# Patient Record
Sex: FEMALE | Race: WHITE | NOT HISPANIC OR LATINO | Employment: FULL TIME | URBAN - METROPOLITAN AREA
[De-identification: names, ages, dates, MRNs, and addresses within clinical notes are randomized per-mention and may not be internally consistent; named-entity substitution may affect disease eponyms.]

---

## 2017-02-06 DIAGNOSIS — Z30.41 ENCOUNTER FOR SURVEILLANCE OF CONTRACEPTIVE PILLS: Primary | ICD-10-CM

## 2017-02-06 RX ORDER — NORGESTIMATE AND ETHINYL ESTRADIOL 7DAYSX3 28
1 KIT ORAL DAILY
Qty: 84 TABLET | Refills: 0 | Status: CANCELLED | OUTPATIENT
Start: 2017-02-06

## 2017-02-06 NOTE — TELEPHONE ENCOUNTER
Pharmacy sent refill request. TC to pt to get her scheduled for AE due 03/2017. LMTC. Mckenzie Starks RN

## 2017-02-10 NOTE — TELEPHONE ENCOUNTER
Pt has decided that she does not want to be on birth control anymore.  Refill cancelled.  Pt scheduled AE for 3/27/17 at [place].  Birdie Vigil RN

## 2017-11-28 ENCOUNTER — OFFICE VISIT (OUTPATIENT)
Dept: FAMILY MEDICINE | Facility: CLINIC | Age: 32
End: 2017-11-28
Payer: COMMERCIAL

## 2017-11-28 VITALS
TEMPERATURE: 97.4 F | DIASTOLIC BLOOD PRESSURE: 70 MMHG | SYSTOLIC BLOOD PRESSURE: 126 MMHG | BODY MASS INDEX: 21.89 KG/M2 | HEIGHT: 66 IN | HEART RATE: 52 BPM | OXYGEN SATURATION: 100 % | WEIGHT: 136.2 LBS

## 2017-11-28 DIAGNOSIS — F41.8 MIXED ANXIETY DEPRESSIVE DISORDER: ICD-10-CM

## 2017-11-28 DIAGNOSIS — R10.9 ABDOMINAL DISCOMFORT: ICD-10-CM

## 2017-11-28 DIAGNOSIS — R06.02 SOB (SHORTNESS OF BREATH): ICD-10-CM

## 2017-11-28 DIAGNOSIS — E06.3 HASHIMOTO'S DISEASE: Primary | ICD-10-CM

## 2017-11-28 LAB
ALBUMIN SERPL-MCNC: 4.1 G/DL (ref 3.4–5)
ALP SERPL-CCNC: 74 U/L (ref 40–150)
ALT SERPL W P-5'-P-CCNC: 23 U/L (ref 0–50)
ANION GAP SERPL CALCULATED.3IONS-SCNC: 8 MMOL/L (ref 3–14)
AST SERPL W P-5'-P-CCNC: 11 U/L (ref 0–45)
BILIRUB SERPL-MCNC: 0.5 MG/DL (ref 0.2–1.3)
BUN SERPL-MCNC: 12 MG/DL (ref 7–30)
CALCIUM SERPL-MCNC: 9.1 MG/DL (ref 8.5–10.1)
CHLORIDE SERPL-SCNC: 106 MMOL/L (ref 94–109)
CO2 SERPL-SCNC: 24 MMOL/L (ref 20–32)
CREAT SERPL-MCNC: 0.78 MG/DL (ref 0.52–1.04)
FERRITIN SERPL-MCNC: 35 NG/ML (ref 12–150)
GFR SERPL CREATININE-BSD FRML MDRD: 85 ML/MIN/1.7M2
GLUCOSE SERPL-MCNC: 83 MG/DL (ref 70–99)
HGB BLD-MCNC: 14.3 G/DL (ref 11.7–15.7)
POTASSIUM SERPL-SCNC: 3.9 MMOL/L (ref 3.4–5.3)
PROT SERPL-MCNC: 7.4 G/DL (ref 6.8–8.8)
SODIUM SERPL-SCNC: 138 MMOL/L (ref 133–144)
T4 FREE SERPL-MCNC: 0.74 NG/DL (ref 0.76–1.46)
TSH SERPL DL<=0.005 MIU/L-ACNC: 6.31 MU/L (ref 0.4–4)

## 2017-11-28 PROCEDURE — 36415 COLL VENOUS BLD VENIPUNCTURE: CPT | Performed by: PHYSICIAN ASSISTANT

## 2017-11-28 PROCEDURE — 84439 ASSAY OF FREE THYROXINE: CPT | Performed by: PHYSICIAN ASSISTANT

## 2017-11-28 PROCEDURE — 85379 FIBRIN DEGRADATION QUANT: CPT | Performed by: PHYSICIAN ASSISTANT

## 2017-11-28 PROCEDURE — 83516 IMMUNOASSAY NONANTIBODY: CPT | Performed by: PHYSICIAN ASSISTANT

## 2017-11-28 PROCEDURE — 82728 ASSAY OF FERRITIN: CPT | Performed by: PHYSICIAN ASSISTANT

## 2017-11-28 PROCEDURE — 85018 HEMOGLOBIN: CPT | Performed by: PHYSICIAN ASSISTANT

## 2017-11-28 PROCEDURE — 99214 OFFICE O/P EST MOD 30 MIN: CPT | Performed by: PHYSICIAN ASSISTANT

## 2017-11-28 PROCEDURE — 86800 THYROGLOBULIN ANTIBODY: CPT | Performed by: PHYSICIAN ASSISTANT

## 2017-11-28 PROCEDURE — 80053 COMPREHEN METABOLIC PANEL: CPT | Performed by: PHYSICIAN ASSISTANT

## 2017-11-28 PROCEDURE — 86376 MICROSOMAL ANTIBODY EACH: CPT | Performed by: PHYSICIAN ASSISTANT

## 2017-11-28 PROCEDURE — 84443 ASSAY THYROID STIM HORMONE: CPT | Performed by: PHYSICIAN ASSISTANT

## 2017-11-28 ASSESSMENT — PATIENT HEALTH QUESTIONNAIRE - PHQ9: SUM OF ALL RESPONSES TO PHQ QUESTIONS 1-9: 4

## 2017-11-28 NOTE — LETTER
My Depression Action Plan  Name: Shayla Nolasco   Date of Birth 1985  Date: 11/28/2017    My doctor: No primary care provider on file.   My clinic: [address] [address] [phone]          GREEN    ZONE   Good Control    What it looks like:     Things are going generally well. You have normal up s and down s. You may even feel depressed from time to time, but bad moods usually last less than a day.   What you need to do:  1. Continue to care for yourself (see self care plan)  2. Check your depression survival kit and update it as needed  3. Follow your physician s recommendations including any medication.  4. Do not stop taking medication unless you consult with your physician first.           YELLOW         ZONE Getting Worse    What it looks like:     Depression is starting to interfere with your life.     It may be hard to get out of bed; you may be starting to isolate yourself from others.    Symptoms of depression are starting to last most all day and this has happened for several days.     You may have suicidal thoughts but they are not constant.   What you need to do:     1. Call your care team, your response to treatment will improve if you keep your care team informed of your progress. Yellow periods are signs an adjustment may need to be made.     2. Continue your self-care, even if you have to fake it!    3. Talk to someone in your support network    4. Open up your depression survival kit           RED    ZONE Medical Alert - Get Help    What it looks like:     Depression is seriously interfering with your life.     You may experience these or other symptoms: You can t get out of bed most days, can t work or engage in other necessary activities, you have trouble taking care of basic hygiene, or basic responsibilities, thoughts of suicide or death that will not go away, self-injurious behavior.     What you need to  do:  1. Call your care team and request a same-day appointment. If they are not available (weekends or after hours) call your local crisis line, emergency room or 911.      Electronically signed by: Romi Rojo, November 28, 2017    Depression Self Care Plan / Survival Kit    Self-Care for Depression  Here s the deal. Your body and mind are really not as separate as most people think.  What you do and think affects how you feel and how you feel influences what you do and think. This means if you do things that people who feel good do, it will help you feel better.  Sometimes this is all it takes.  There is also a place for medication and therapy depending on how severe your depression is, so be sure to consult with your medical provider and/ or Behavioral Health Consultant if your symptoms are worsening or not improving.     In order to better manage my stress, I will:    Exercise  Get some form of exercise, every day. This will help reduce pain and release endorphins, the  feel good  chemicals in your brain. This is almost as good as taking antidepressants!  This is not the same as joining a gym and then never going! (they count on that by the way ) It can be as simple as just going for a walk or doing some gardening, anything that will get you moving.      Hygiene   Maintain good hygiene (Get out of bed in the morning, Make your bed, Brush your teeth, Take a shower, and Get dressed like you were going to work, even if you are unemployed).  If your clothes don't fit try to get ones that do.    Diet  I will strive to eat foods that are good for me, drink plenty of water, and avoid excessive sugar, caffeine, alcohol, and other mood-altering substances.  Some foods that are helpful in depression are: complex carbohydrates, B vitamins, flaxseed, fish or fish oil, fresh fruits and vegetables.    Psychotherapy  I agree to participate in Individual Therapy (if recommended).    Medication  If prescribed medications, I  agree to take them.  Missing doses can result in serious side effects.  I understand that drinking alcohol, or other illicit drug use, may cause potential side effects.  I will not stop my medication abruptly without first discussing it with my provider.    Staying Connected With Others  I will stay in touch with my friends, family members, and my primary care provider/team.    Use your imagination  Be creative.  We all have a creative side; it doesn t matter if it s oil painting, sand castles, or mud pies! This will also kick up the endorphins.    Witness Beauty  (AKA stop and smell the roses) Take a look outside, even in mid-winter. Notice colors, textures. Watch the squirrels and birds.     Service to others  Be of service to others.  There is always someone else in need.  By helping others we can  get out of ourselves  and remember the really important things.  This also provides opportunities for practicing all the other parts of the program.    Humor  Laugh and be silly!  Adjust your TV habits for less news and crime-drama and more comedy.    Control your stress  Try breathing deep, massage therapy, biofeedback, and meditation. Find time to relax each day.     My support system    Clinic Contact:  Phone number:    Contact 1:  Phone number:    Contact 2:  Phone number:    Zoroastrianism/:  Phone number:    Therapist:  Phone number:    Local crisis center:    Phone number:    Other community support:  Phone number:

## 2017-11-28 NOTE — MR AVS SNAPSHOT
After Visit Summary   11/28/2017    Shayla Nolasco    MRN: [id number]           Patient Information     Date Of Birth          1985        Visit Information        Provider Department      11/28/2017 8:00 AM Agnieszka Bowman PA-C [facility]        Today's Diagnoses     Mixed anxiety depressive disorder    -  1    Subclinical hypothyroidism        SOB (shortness of breath)        Abdominal discomfort        Family history of ovarian cancer          Care Instructions    Labs today   Schedule follow up with breast center  I'll contact you with results  Return to clinic for any new or worsening symptoms or go to ER Urgent care in off hours            Follow-ups after your visit        Additional Services     BREAST CENTER REFERRAL       Your provider has referred you to: [facility]: Breast Center at General acute hospital [phone]   [email or web address]    Please be aware that coverage of these services is subject to the terms and limitations of your health insurance plan.  Call member services at your health plan with any benefit or coverage questions.      Please bring the following with you to your appointment:    (1) Any X-Rays, CTs or MRIs which have been performed.  Contact the facility where they were done to arrange for  prior to your scheduled appointment.    (2) List of current medications   (3) This referral request   (4) Any documents/labs given to you for this referral                  Who to contact     If you have questions or need follow up information about today's clinic visit or your schedule please contact [facility] directly at [phone].  Normal or non-critical lab and imaging results will be communicated to you by MyChart, letter or phone within 4 business days after the clinic has received the results. If you do not hear from us within 7 days, please contact  "the clinic through [employer] or phone. If you have a critical or abnormal lab result, we will notify you by phone as soon as possible.  Submit refill requests through [employer] or call your pharmacy and they will forward the refill request to us. Please allow 3 business days for your refill to be completed.          Additional Information About Your Visit        [employer] Information     [employer] gives you secure access to your electronic health record. If you see a primary care provider, you can also send messages to your care team and make appointments. If you have questions, please call your primary care clinic.  If you do not have a primary care provider, please call [phone] and they will assist you.        Care EveryWhere ID     This is your Care EveryWhere ID. This could be used by other organizations to access your [facility] medical records  [id number]        Your Vitals Were     Pulse Temperature Height Last Period Pulse Oximetry BMI (Body Mass Index)    52 97.4  F (36.3  C) (Oral) 5' 6\" (1.676 m) 11/11/2017 100% 21.98 kg/m2       Blood Pressure from Last 3 Encounters:   11/28/17 126/70   08/10/16 135/84   03/18/16 117/69    Weight from Last 3 Encounters:   11/28/17 136 lb 3.2 oz (61.8 kg)   08/10/16 131 lb (59.4 kg)   03/18/16 132 lb (59.9 kg)              We Performed the Following     Anti thyroglobulin antibody     BREAST CENTER REFERRAL     Comprehensive metabolic panel     D dimer, quantitative     DEPRESSION ACTION PLAN (DAP)     Ferritin     Hemoglobin     Thyroid peroxidase antibody     Tissue transglutaminase leanne IgA and IgG     TSH with free T4 reflex          Today's Medication Changes          These changes are accurate as of: 11/28/17  8:44 AM.  If you have any questions, ask your nurse or doctor.               Stop taking these medicines if you haven't already. Please contact your care team if you have questions.     metroNIDAZOLE 500 MG tablet   Commonly known as:  FLAGYL   Stopped by:  " Agnieszka Bowman PA-C           nitroFURantoin (macrocrystal-monohydrate) 100 MG capsule   Commonly known as:  MACROBID   Stopped by:  Agnieszka Bowman PA-C           norgestim-eth estrad triphasic 0.18/0.215/0.25 MG-35 MCG per tablet   Commonly known as:  TRINESSA (28)   Stopped by:  Agnieszka Bowman PA-C                    Primary Care Provider Fax #    Physician No Ref-Primary [phone]       No address on file        Equal Access to Services     [facility]: Hadii aad ku hadasho Soomaali, waaxda luqadaha, qaybta kaalmada adeegyada, waxruma jamison . So [facility] [phone].    ATENCIÓN: Si habla español, tiene a foley disposición servicios gratuitos de asistencia lingüística. [facility] [phone].    We comply with applicable federal civil rights laws and [place] laws. We do not discriminate on the basis of race, color, national origin, age, disability, sex, sexual orientation, or gender identity.            Thank you!     Thank you for choosing [facility]  for your care. Our goal is always to provide you with excellent care. Hearing back from our patients is one way we can continue to improve our services. Please take a few minutes to complete the written survey that you may receive in the mail after your visit with us. Thank you!             Your Updated Medication List - Protect others around you: Learn how to safely use, store and throw away your medicines at www.disposemymeds.org.          This list is accurate as of: 11/28/17  8:44 AM.  Always use your most recent med list.                   Brand Name Dispense Instructions for use Diagnosis    hydrOXYzine 25 MG tablet    ATARAX    60 tablet    Take 1 tablet (25 mg) by mouth At Bedtime    Rash       ketoconazole 2 % cream    NIZORAL    60 g    Apply topically daily    TV (tinea versicolor)       levothyroxine 25 MCG tablet    SYNTHROID/LEVOTHROID    45 tablet    Take 0.5 tablets  (12.5 mcg) by mouth daily    Subclinical hypothyroidism       MULTIVITAMIN PO           triamcinolone 0.1 % ointment    KENALOG    453 g    Apply topically 2 times daily    Rash

## 2017-11-28 NOTE — PROGRESS NOTES
"  SUBJECTIVE:   Shayla Nolasco is a 31 year old female who presents to clinic today for the following health issues:    Hypothyroidism Follow-up      Since last visit, patient describes the following symptoms: { :735573::\"Weight stable, no hair loss, no skin changes, no constipation, no loose stools\"}      Amount of exercise or physical activity: {Exercise frequency days per week:444107}    Problems taking medications regularly: {Med Problems:315882::\"No\"}    Medication side effects: {CHRONIC MED SIDE EFFECTS:874685::\"none\"}    Diet: { :155882}          {additional problems for provider to add:541383}    Problem list and histories reviewed & adjusted, as indicated.  Additional history: {NONE - AS DOCUMENTED:146961::\"as documented\"}    {HIST REVIEW/ LINKS 2:784779}    Reviewed and updated as needed this visit by clinical staff     Reviewed and updated as needed this visit by Provider         {PROVIDER CHARTING PREFERENCE:970394}    "

## 2017-11-28 NOTE — PATIENT INSTRUCTIONS
Labs today   Schedule follow up with breast center  I'll contact you with results  Return to clinic for any new or worsening symptoms or go to ER Urgent care in off hours

## 2017-11-28 NOTE — PROGRESS NOTES
SUBJECTIVE:   Shayla Nolasco is a 31 year old female who presents to clinic today for the following health issues:    Hypothyroidism Follow-up      Since last visit, patient describes the following symptoms: anxiety and fatigue  Was diagnosed in March of 2016 but never went with the treatment plan, would like to be tested again      Amount of exercise or physical activity: 4-5 days/week for an average of 30-60 minutes    Problems taking medications regularly: No    Medication side effects: none    Diet: vegetarian/vegan        Sister in law was recently diagnosed with hypothyroidism and she realized how much better she felt after starting the medications    She had started the levothyroxine for about 6 weeks, doesn't remember if there was a difference. She was seeing a therapist. She felt more therapy was causing more anxiety because of the cost and what it was bringing up. Focused more on meditation and yoga. Still does some distance running but not marathons.     Also reports continued shortness of breath for several years. She's tried inhalers before. It's gotten worse over the last several years. Spirometry was normal.     Mom passed away from ovarian cancer. Shayla was tested for BRACA gene which was negative. Per OB, no further follow up needed.         Problem list and histories reviewed & adjusted, as indicated.  Additional history: as documented    ROS:  C: NEGATIVE for fever, chills, change in weight  I: NEGATIVE for worrisome rashes, moles or lesions  E: NEGATIVE for vision changes or irritation  E/M: NEGATIVE for ear, mouth and throat problems  RESP:NEGATIVE for cough-non productive, hemoptysis, pleurisy and wheezing  CV: NEGATIVE for chest pain, palpitations or peripheral edema  GI: NEGATIVE for diarrhea, heartburn or reflux, jaundice and melena  : NEGATIVE for frequency, dysuria, or hematuria  M: NEGATIVE for significant arthralgias or myalgia  N: NEGATIVE for weakness, dizziness or  "paresthesias  E: NEGATIVE for temperature intolerance, skin/hair changes  H: NEGATIVE for bleeding problems  P: NEGATIVE for changes in mood or affect    Patient Active Problem List   Diagnosis     QUEENIE III (cervical intraepithelial neoplasia III)     Family history of ovarian cancer     Family history of BRCA gene positive     Mixed anxiety depressive disorder     Subclinical hypothyroidism     Past Surgical History:   Procedure Laterality Date     CL AFF SURGICAL PATHOLOGY  10/2007    LEEP     CL AFF SURGICAL PATHOLOGY  9/2007       Social History   Substance Use Topics     Smoking status: Never Smoker     Smokeless tobacco: Never Used     Alcohol use 2.5 oz/week     5 Glasses of wine per week     Family History   Problem Relation Age of Onset     DIABETES Mother      IDDM (type one)     CANCER Mother 51     Ovarian Cancer (3 times)     DIABETES Maternal Grandmother      IDDM (adult onset) Type 2     Hypertension Maternal Grandmother      Prostate Cancer Paternal Grandfather      Cancer - colorectal Paternal Grandfather      age 70's (had stomach Ca as a younger man)     Neurologic Disorder Father      migraines     GASTROINTESTINAL DISEASE Father      stomach ulcers     Lipids Maternal Grandfather      high cholesterol     Circulatory Maternal Grandfather      blood clots     C.A.D. Maternal Grandfather      MI     Skin Cancer No family hx of      no skin cancer           Labs reviewed in EPIC  BP Readings from Last 3 Encounters:   11/28/17 126/70   08/10/16 135/84   03/18/16 117/69    Wt Readings from Last 3 Encounters:   11/28/17 136 lb 3.2 oz (61.8 kg)   08/10/16 131 lb (59.4 kg)   03/18/16 132 lb (59.9 kg)                      OBJECTIVE:                                                    /70  Pulse 52  Temp 97.4  F (36.3  C) (Oral)  Ht 5' 6\" (1.676 m)  Wt 136 lb 3.2 oz (61.8 kg)  LMP 11/11/2017  SpO2 100%  BMI 21.98 kg/m2 Body mass index is 21.98 kg/(m^2).   GENERAL: healthy, alert, well " nourished, well hydrated, no distress  EYES: Eyes grossly normal to inspection, extraocular movements - intact, and PERRL  HENT: ear canals- normal; TMs- normal; Nose- normal; Mouth- no ulcers, no lesions  NECK: no tenderness, no adenopathy, no asymmetry, no masses, no stiffness; thyroid- normal to palpation  RESP: lungs clear to auscultation - no rales, no rhonchi, no wheezes  CV: regular rates and rhythm, normal S1 S2, no S3 or S4 and no murmur, no click or rub -  ABDOMEN: soft, no tenderness, no  hepatosplenomegaly, no masses, normal bowel sounds  MS: extremities- no gross deformities noted, no edema  SKIN: no suspicious lesions, no rashes  NEURO: strength and tone- normal, sensory exam- grossly normal, mentation- intact, speech- normal, reflexes- symmetric  PSYCH: Alert and oriented times 3; speech- coherent , normal rate and volume; able to articulate logical thoughts, able to abstract reason, no tangential thoughts, no hallucinations or delusions, affect- normal    Results for orders placed or performed in visit on 11/28/17 (from the past 24 hour(s))   Hemoglobin   Result Value Ref Range    Hemoglobin 14.3 11.7 - 15.7 g/dL          ASSESSMENT/PLAN:                                                        ICD-10-CM    1. Subclinical hypothyroidism E03.9 TSH with free T4 reflex     Anti thyroglobulin antibody     Thyroid peroxidase antibody   2. SOB (shortness of breath) R06.02 Ferritin     Hemoglobin     Comprehensive metabolic panel     D dimer, quantitative   3. Abdominal discomfort R10.9 Tissue transglutaminase leanne IgA and IgG   4. Mixed anxiety depressive disorder F41.8      Check labs. Will refer to appropriate place depending on labs. Rule out elevated thyroid antibodies and celiacs. Check iron levels to rule out anemia which could be causing some of her shortness of breath. Spirometry has been normal and she hasn't responded to inhalers. We'll check a d-dimer to rule out PE. Return to clinic for any new or  "worsening symptoms or go to ER Urgent care in off hours    > 25 minutes were spent with the patient and / or family present in education and / or counseling regarding the above issues.  This represented more than 50% of the time spent interacting with the patient during this visit.     Patient Instructions   Labs today   Schedule follow up with breast center  I'll contact you with results  Return to clinic for any new or worsening symptoms or go to ER Urgent care in off hours          Estimated body mass index is 21.98 kg/(m^2) as calculated from the following:    Height as of this encounter: 5' 6\" (1.676 m).    Weight as of this encounter: 136 lb 3.2 oz (61.8 kg).       Agnieszka Villarreal [facility]    "

## 2017-11-29 LAB
D DIMER PPP FEU-MCNC: <0.3 UG/ML FEU (ref 0–0.5)
THYROGLOB AB SERPL IA-ACNC: <20 IU/ML (ref 0–40)
THYROPEROXIDASE AB SERPL-ACNC: 269 IU/ML
TTG IGA SER-ACNC: <1 U/ML
TTG IGG SER-ACNC: <1 U/ML

## 2017-11-30 RX ORDER — LEVOTHYROXINE SODIUM 25 UG/1
12.5 TABLET ORAL DAILY
Qty: 45 TABLET | Refills: 0 | Status: SHIPPED | OUTPATIENT
Start: 2017-11-30 | End: 2018-01-15

## 2018-01-15 DIAGNOSIS — E06.3 HASHIMOTO'S DISEASE: ICD-10-CM

## 2018-01-15 LAB — TSH SERPL DL<=0.005 MIU/L-ACNC: 3.97 MU/L (ref 0.4–4)

## 2018-01-15 PROCEDURE — 84443 ASSAY THYROID STIM HORMONE: CPT | Performed by: PHYSICIAN ASSISTANT

## 2018-01-15 PROCEDURE — 36415 COLL VENOUS BLD VENIPUNCTURE: CPT | Performed by: PHYSICIAN ASSISTANT

## 2018-01-15 RX ORDER — LEVOTHYROXINE SODIUM 25 UG/1
12.5 TABLET ORAL DAILY
Qty: 45 TABLET | Refills: 1 | Status: SHIPPED | OUTPATIENT
Start: 2018-01-15 | End: 2018-08-25

## 2018-06-04 ENCOUNTER — HEALTH MAINTENANCE LETTER (OUTPATIENT)
Age: 33
End: 2018-06-04

## 2018-08-25 DIAGNOSIS — E06.3 HASHIMOTO'S DISEASE: ICD-10-CM

## 2018-08-28 RX ORDER — LEVOTHYROXINE SODIUM 25 UG/1
TABLET ORAL
Qty: 45 TABLET | Refills: 1 | Status: SHIPPED | OUTPATIENT
Start: 2018-08-28 | End: 2018-12-13

## 2018-08-28 NOTE — TELEPHONE ENCOUNTER
Prescription approved per [facility] Refill Protocol.    Caroline Pineda RN   [facility]

## 2018-09-19 ENCOUNTER — OFFICE VISIT (OUTPATIENT)
Dept: FAMILY MEDICINE | Facility: CLINIC | Age: 33
End: 2018-09-19
Payer: COMMERCIAL

## 2018-09-19 VITALS
SYSTOLIC BLOOD PRESSURE: 130 MMHG | OXYGEN SATURATION: 97 % | DIASTOLIC BLOOD PRESSURE: 79 MMHG | TEMPERATURE: 98.3 F | HEART RATE: 52 BPM | WEIGHT: 137 LBS | HEIGHT: 66 IN | BODY MASS INDEX: 22.02 KG/M2 | RESPIRATION RATE: 14 BRPM

## 2018-09-19 DIAGNOSIS — F41.8 MIXED ANXIETY DEPRESSIVE DISORDER: ICD-10-CM

## 2018-09-19 DIAGNOSIS — K21.9 GASTROESOPHAGEAL REFLUX DISEASE WITHOUT ESOPHAGITIS: Primary | ICD-10-CM

## 2018-09-19 DIAGNOSIS — E06.3 HASHIMOTO'S DISEASE: ICD-10-CM

## 2018-09-19 DIAGNOSIS — Z23 NEED FOR VACCINATION: ICD-10-CM

## 2018-09-19 PROCEDURE — 99214 OFFICE O/P EST MOD 30 MIN: CPT | Mod: 25 | Performed by: FAMILY MEDICINE

## 2018-09-19 PROCEDURE — 90471 IMMUNIZATION ADMIN: CPT | Performed by: FAMILY MEDICINE

## 2018-09-19 PROCEDURE — 90686 IIV4 VACC NO PRSV 0.5 ML IM: CPT | Performed by: FAMILY MEDICINE

## 2018-09-19 ASSESSMENT — ANXIETY QUESTIONNAIRES
GAD7 TOTAL SCORE: 13
3. WORRYING TOO MUCH ABOUT DIFFERENT THINGS: MORE THAN HALF THE DAYS
1. FEELING NERVOUS, ANXIOUS, OR ON EDGE: MORE THAN HALF THE DAYS
2. NOT BEING ABLE TO STOP OR CONTROL WORRYING: MORE THAN HALF THE DAYS
IF YOU CHECKED OFF ANY PROBLEMS ON THIS QUESTIONNAIRE, HOW DIFFICULT HAVE THESE PROBLEMS MADE IT FOR YOU TO DO YOUR WORK, TAKE CARE OF THINGS AT HOME, OR GET ALONG WITH OTHER PEOPLE: SOMEWHAT DIFFICULT
7. FEELING AFRAID AS IF SOMETHING AWFUL MIGHT HAPPEN: MORE THAN HALF THE DAYS
6. BECOMING EASILY ANNOYED OR IRRITABLE: SEVERAL DAYS
5. BEING SO RESTLESS THAT IT IS HARD TO SIT STILL: MORE THAN HALF THE DAYS

## 2018-09-19 ASSESSMENT — PATIENT HEALTH QUESTIONNAIRE - PHQ9: 5. POOR APPETITE OR OVEREATING: MORE THAN HALF THE DAYS

## 2018-09-19 NOTE — PROGRESS NOTES
SUBJECTIVE:   Shayla Nolasco is a 32 year old female who presents to clinic today for the following health issues:      Abdominal Pain      Duration: x1 week    Description (location/character/radiation): upper epi-gastric pain, always after eating- took niki salzer not better- got more worried due to family history of stmach acnecr.    Does not  Take  over the counter pain meds            Associated flank pain: None    Intensity:  severe    Accompanying signs and symptoms:        Fever/Chills: no        Gas/Bloating: YES-burpy       Nausea/vomitting: YES- nausea       Diarrhea: no        Dysuria or Hematuria: no     History (previous similar pain/trauma/previous testing): stomach pain as teen-testing at [facility], inconclusive-history heartburn/indigestion    Precipitating or alleviating factors:       Pain worse with eating/BM/urination: yes, eating       Pain relieved by BM: no     Therapies tried and outcome: niki-seltzer, heartburn tabs    LMP:  9-10-18  grandpa- deid of stomach caner - so  asked her to be checked  She does report she is freaking out because of family history   Mom  of breast cancer at age 57, had ovarian cancer    PROBLEMS TO ADD ON...stressed about possibility of moving to [place] for  job  Last/ final interview this weekend- they are going to check it out- hot air balloon ride- worried about weather. Does report she is a worrier.  History of anxiety- did do some therapy- but it did   Help but always had struggle to decide to go to therapy getting there was stressful & after therapy- over analyzed & at the end caused her more stress.      Problem list and histories reviewed & adjusted, as indicated.  Additional history: as documented    Patient Active Problem List   Diagnosis     QUEENIE III (cervical intraepithelial neoplasia III)     Family history of ovarian cancer     Family history of BRCA gene positive     Mixed anxiety depressive disorder     Hashimoto's disease     Past  "Surgical History:   Procedure Laterality Date     CL AFF SURGICAL PATHOLOGY  10/2007    LEEP     CL AFF SURGICAL PATHOLOGY  9/2007       Social History   Substance Use Topics     Smoking status: Never Smoker     Smokeless tobacco: Never Used     Alcohol use 2.5 oz/week     5 Glasses of wine per week     Family History   Problem Relation Age of Onset     Diabetes Mother      IDDM (type one)     Cancer Mother 51     Ovarian Cancer (3 times)     Diabetes Maternal Grandmother      IDDM (adult onset) Type 2     Hypertension Maternal Grandmother      Prostate Cancer Paternal Grandfather      Cancer - colorectal Paternal Grandfather      age 70's (had stomach Ca as a younger man)     Neurologic Disorder Father      migraines     GASTROINTESTINAL DISEASE Father      stomach ulcers     Lipids Maternal Grandfather      high cholesterol     Circulatory Maternal Grandfather      blood clots     C.A.D. Maternal Grandfather      MI     Skin Cancer No family hx of      no skin cancer           Reviewed and updated as needed this visit by clinical staff       Reviewed and updated as needed this visit by Provider         ROS:  Constitutional, HEENT, cardiovascular, pulmonary, gi and gu systems are negative, except as otherwise noted.    OBJECTIVE:     /79  Pulse 52  Temp 98.3  F (36.8  C) (Oral)  Resp 14  Ht 5' 6\" (1.676 m)  Wt 137 lb (62.1 kg)  LMP 09/10/2018 (Exact Date)  SpO2 97%  Breastfeeding? No  BMI 22.11 kg/m2  Body mass index is 22.11 kg/(m^2).  GENERAL: healthy, alert and no distress  NECK: no adenopathy, no asymmetry, masses, or scars and thyroid normal to palpation  RESP: lungs clear to auscultation - no rales, rhonchi or wheezes  CV: regular rate and rhythm, normal S1 S2, no S3 or S4, no murmur, click or rub, no peripheral edema and peripheral pulses strong  ABDOMEN: soft, nontender, no hepatosplenomegaly, no masses and bowel sounds normal  MS: no gross musculoskeletal defects noted, no edema  PSYCH: " mentation appears normal, affect normal/bright  PHQ-9 SCORE 3/15/2016 11/28/2017 9/19/2018   Total Score 11 4 5     CATE-7 SCORE 3/15/2016 9/19/2018   Total Score 13 13       ASSESSMENT/PLAN:   1. Gastroesophageal reflux disease without esophagitis  Plan:start proton pump inhibitors for 8 weeks and then as needed    - omeprazole (PRILOSEC) 20 MG CR capsule; Take 1 capsule (20 mg) by mouth daily  Dispense: 30 capsule; Refill: 3  - ok to take margo in between  ok to stop medications- if does not like side effects.    2. Mixed anxiety depressive disorder  Plan: detail discussion about self care , yoga, sleep hydration.  Consider counseling , patient not ready yet.  encouraged to follow up to discuss further option.  CATE-7 SCORE 3/15/2016 9/19/2018   Total Score 13 13     PHQ-9 SCORE 3/15/2016 11/28/2017 9/19/2018   Total Score 11 4 5           3. Need for vaccination  - FLU VAC PRESRV FREE QUAD SPLIT VIR, IM (3+ YRS)  - ADMIN 1st VACCINE    History of hypothyroidism  Lab Results   Component Value Date    TSH 3.97 01/15/2018   Levothyroxine 12.5 mcg once daily  No change         Rina Bailey MD  [facility]

## 2018-09-19 NOTE — NURSING NOTE
"Chief Complaint   Patient presents with     Abdominal Pain       Initial /79  Pulse 52  Temp 98.3  F (36.8  C) (Oral)  Resp 14  Ht 5' 6\" (1.676 m)  Wt 137 lb (62.1 kg)  LMP 09/10/2018 (Exact Date)  SpO2 97%  Breastfeeding? No  BMI 22.11 kg/m2 Estimated body mass index is 22.11 kg/(m^2) as calculated from the following:    Height as of this encounter: 5' 6\" (1.676 m).    Weight as of this encounter: 137 lb (62.1 kg).  BP completed using cuff size: regular    Health Maintenance that is potentially due pending provider review:  Health Maintenance Due   Topic Date Due     PAP Q3 YR  04/01/2018     PHQ-9 Q6 MONTHS  05/28/2018     INFLUENZA VACCINE (1) 09/01/2018       [id number]  Flu vaccine today  PHQ 9 given  "

## 2018-09-19 NOTE — PATIENT INSTRUCTIONS
Tips to Control Acid Reflux    To control acid reflux, you ll need to make some basic diet and lifestyle changes. The simple steps outlined below may be all you ll need to ease discomfort.  Watch what you eat    Avoid fatty foods and spicy foods.    Eat fewer acidic foods, such as citrus and tomato-based foods. These can increase symptoms.    Limit drinking alcohol, caffeine, and fizzy beverages. All increase acid reflux.    Try limiting chocolate, peppermint, and spearmint. These can worsen acid reflux in some people.  Watch when you eat    Avoid lying down for 3 hours after eating.    Do not snack before going to bed.  Raise your head  Raising your head and upper body by 4 to 6 inches helps limit reflux when you re lying down. Put blocks under the head of your bed frame to raise it.  Other changes    Lose weight, if you need to    Don t exercise near bedtime    Avoid tight-fitting clothes    Limit aspirin and ibuprofen    Stop smoking   Date Last Reviewed: 7/1/2016 2000-2017 The [employer]. [address] [place] [address], [place] [address]. All rights reserved. This information is not intended as a substitute for professional medical care. Always follow your healthcare professional's instructions.        Treating Anxiety Disorders with Medicine  An anxiety disorder can make you feel nervous or apprehensive, even without a clear reason. In people age 65 and older, generalized anxiety disorder is one of the most commonly diagnosed anxiety disorders. Many times it occurs with depression. Certain anxiety disorders can cause intense feelings of fear or panic. You may even have physical symptoms such as a racing heartbeat, sweating, or dizziness. If you have these feelings, you don t have to suffer anymore. Treatment to help you overcome your fears will likely include therapy (also called counseling). Medicine may also be prescribed to help control your symptoms.    Medicines  Certain medicines may be prescribed  to help control your symptoms. So you may feel less anxious. You may also feel able to move forward with therapy. At first, medicines and dosages may need to be adjusted to find what works best for you. Try to be patient. Tell your healthcare provider how a medicine makes you feel. This way, you can work together to find the treatment that s best for you. Keep in mind that medicines can have side effects. Talk with your provider about any side effects that are bothering you. Changing the dose or type of medicine may help. Don t stop taking medicine on your own. That can cause symptoms to come back.    Anti-anxiety medicine. This medicine eases symptoms and helps you relax. Your healthcare provider will explain when and how to use it. It may be prescribed for use before situations that make you anxious. You may also be told to take medicine on a regular schedule. Anti-anxiety medicine may make you feel a little sleepy or  out of it.  Don t drive a car or operate machinery while on this medicine, until you know how it affects you.  Caution  Never use alcohol or other drugs with anti-anxiety medicines. This could result in loss of muscular control, sedation, coma, or death. Also, use only the amount of medicine prescribed for you. If you think you may have taken too much, get emergency care right away.     Antidepressant medicine. This kind of medicine is often used to treat anxiety, even if you aren t depressed. An antidepressant helps balance out brain chemicals. This helps keep anxiety under control. This medicine is taken on a schedule. It takes a few weeks to start working. If you don t notice a change at first, you may just need more time. But if you don t notice results after the first few weeks, tell your provider.  Keep taking medicines as prescribed  Never change your dosage, share or use another person's medicine, or stop taking your medicines without talking to your healthcare provider first. Keep the  following in mind:    Some medicines must be taken on a schedule. Make this part of your daily routine. For instance, always take your pill before brushing your teeth. A pillbox can help you remember if you ve taken your medicine each day.    Medicines are often taken for 6 to 12 months. Your healthcare provider will then evaluate whether you need to stay on them. Many people who have also had therapy may no longer need medicine to manage anxiety.    You may need to stop taking medicine slowly to give your body time to adjust. When it s time to stop, your healthcare provider will tell you more. Remember: Never stop taking your medicine without talking to your provider first.    If symptoms return, you may need to start taking medicines again. This isn t your fault. It s just the nature of your anxiety disorder.  Special concerns    Side effects. Medicines may cause side effects. Ask your healthcare provider or pharmacist what you can expect. They may have ideas for avoiding some side effects.    Sexual problems. Some antidepressants can affect your desire for sex or your ability to have an orgasm. A change in dosage or medicine often solves the problem. If you have a sexual side effect that concerns you, tell your healthcare provider.    Addiction. If you ve never had a problem with drugs or alcohol, you may not have a problem with medicines used to treat anxiety disorders. But always discuss the medicines with your healthcare provider before taking them. If you have a history of addiction, you may not be able to use certain medicines used to treat anxiety disorders.    Medicine interactions. Always check with your pharmacist before using any over-the-counter medicines, including herbal supplements.   Date Last Reviewed: 5/1/2017 2000-2017 [employer]. [address] [place] [address], [place] [address]. All rights reserved. This information is not intended as a substitute for professional medical care.  Always follow your healthcare professional's instructions.

## 2018-09-19 NOTE — MR AVS SNAPSHOT
After Visit Summary   9/19/2018    Shayla Nolasco    MRN: [id number]           Patient Information     Date Of Birth          1985        Visit Information        Provider Department      9/19/2018 9:40 AM Rina Bailey MD [facility]        Today's Diagnoses     Gastroesophageal reflux disease without esophagitis    -  1    Mixed anxiety depressive disorder        Hashimoto's disease        Need for vaccination          Care Instructions      Tips to Control Acid Reflux    To control acid reflux, you ll need to make some basic diet and lifestyle changes. The simple steps outlined below may be all you ll need to ease discomfort.  Watch what you eat    Avoid fatty foods and spicy foods.    Eat fewer acidic foods, such as citrus and tomato-based foods. These can increase symptoms.    Limit drinking alcohol, caffeine, and fizzy beverages. All increase acid reflux.    Try limiting chocolate, peppermint, and spearmint. These can worsen acid reflux in some people.  Watch when you eat    Avoid lying down for 3 hours after eating.    Do not snack before going to bed.  Raise your head  Raising your head and upper body by 4 to 6 inches helps limit reflux when you re lying down. Put blocks under the head of your bed frame to raise it.  Other changes    Lose weight, if you need to    Don t exercise near bedtime    Avoid tight-fitting clothes    Limit aspirin and ibuprofen    Stop smoking   Date Last Reviewed: 7/1/2016 2000-2017 [employer]. [address] [place] [address], [place] [address]. All rights reserved. This information is not intended as a substitute for professional medical care. Always follow your healthcare professional's instructions.        Treating Anxiety Disorders with Medicine  An anxiety disorder can make you feel nervous or apprehensive, even without a clear reason. In people age 65 and older, generalized anxiety disorder is one of the most commonly diagnosed  anxiety disorders. Many times it occurs with depression. Certain anxiety disorders can cause intense feelings of fear or panic. You may even have physical symptoms such as a racing heartbeat, sweating, or dizziness. If you have these feelings, you don t have to suffer anymore. Treatment to help you overcome your fears will likely include therapy (also called counseling). Medicine may also be prescribed to help control your symptoms.    Medicines  Certain medicines may be prescribed to help control your symptoms. So you may feel less anxious. You may also feel able to move forward with therapy. At first, medicines and dosages may need to be adjusted to find what works best for you. Try to be patient. Tell your healthcare provider how a medicine makes you feel. This way, you can work together to find the treatment that s best for you. Keep in mind that medicines can have side effects. Talk with your provider about any side effects that are bothering you. Changing the dose or type of medicine may help. Don t stop taking medicine on your own. That can cause symptoms to come back.    Anti-anxiety medicine. This medicine eases symptoms and helps you relax. Your healthcare provider will explain when and how to use it. It may be prescribed for use before situations that make you anxious. You may also be told to take medicine on a regular schedule. Anti-anxiety medicine may make you feel a little sleepy or  out of it.  Don t drive a car or operate machinery while on this medicine, until you know how it affects you.  Caution  Never use alcohol or other drugs with anti-anxiety medicines. This could result in loss of muscular control, sedation, coma, or death. Also, use only the amount of medicine prescribed for you. If you think you may have taken too much, get emergency care right away.     Antidepressant medicine. This kind of medicine is often used to treat anxiety, even if you aren t depressed. An antidepressant helps  balance out brain chemicals. This helps keep anxiety under control. This medicine is taken on a schedule. It takes a few weeks to start working. If you don t notice a change at first, you may just need more time. But if you don t notice results after the first few weeks, tell your provider.  Keep taking medicines as prescribed  Never change your dosage, share or use another person's medicine, or stop taking your medicines without talking to your healthcare provider first. Keep the following in mind:    Some medicines must be taken on a schedule. Make this part of your daily routine. For instance, always take your pill before brushing your teeth. A pillbox can help you remember if you ve taken your medicine each day.    Medicines are often taken for 6 to 12 months. Your healthcare provider will then evaluate whether you need to stay on them. Many people who have also had therapy may no longer need medicine to manage anxiety.    You may need to stop taking medicine slowly to give your body time to adjust. When it s time to stop, your healthcare provider will tell you more. Remember: Never stop taking your medicine without talking to your provider first.    If symptoms return, you may need to start taking medicines again. This isn t your fault. It s just the nature of your anxiety disorder.  Special concerns    Side effects. Medicines may cause side effects. Ask your healthcare provider or pharmacist what you can expect. They may have ideas for avoiding some side effects.    Sexual problems. Some antidepressants can affect your desire for sex or your ability to have an orgasm. A change in dosage or medicine often solves the problem. If you have a sexual side effect that concerns you, tell your healthcare provider.    Addiction. If you ve never had a problem with drugs or alcohol, you may not have a problem with medicines used to treat anxiety disorders. But always discuss the medicines with your healthcare provider  before taking them. If you have a history of addiction, you may not be able to use certain medicines used to treat anxiety disorders.    Medicine interactions. Always check with your pharmacist before using any over-the-counter medicines, including herbal supplements.   Date Last Reviewed: 5/1/2017 2000-2017 [employer]. [address] [place] [address]. All rights reserved. This information is not intended as a substitute for professional medical care. Always follow your healthcare professional's instructions.                Follow-ups after your visit        Who to contact     If you have questions or need follow up information about today's clinic visit or your schedule please contact [facility] directly at [phone].  Normal or non-critical lab and imaging results will be communicated to you by Hipvanhart, letter or phone within 4 business days after the clinic has received the results. If you do not hear from us within 7 days, please contact the clinic through Vittanat or phone. If you have a critical or abnormal lab result, we will notify you by phone as soon as possible.  Submit refill requests through Maiyet or call your pharmacy and they will forward the refill request to us. Please allow 3 business days for your refill to be completed.          Additional Information About Your Visit        [employer]har20[employer] Information     Maiyet gives you secure access to your electronic health record. If you see a primary care provider, you can also send messages to your care team and make appointments. If you have questions, please call your primary care clinic.  If you do not have a primary care provider, please call [phone] and they will assist you.        Care EveryWhere ID     This is your Care EveryWhere ID. This could be used by other organizations to access your [place] medical records  [id number]        Your Vitals Were     Pulse Temperature Respirations Height Last Period  "Pulse Oximetry    52 98.3  F (36.8  C) (Oral) 14 5' 6\" (1.676 m) 09/10/2018 (Exact Date) 97%    Breastfeeding? BMI (Body Mass Index)                No 22.11 kg/m2           Blood Pressure from Last 3 Encounters:   09/19/18 130/79   11/28/17 126/70   08/10/16 135/84    Weight from Last 3 Encounters:   09/19/18 137 lb (62.1 kg)   11/28/17 136 lb 3.2 oz (61.8 kg)   08/10/16 131 lb (59.4 kg)              We Performed the Following     ADMIN 1st VACCINE     FLU VAC PRESRV FREE QUAD SPLIT VIR, IM (3+ YRS)          Today's Medication Changes          These changes are accurate as of 9/19/18 11:04 AM.  If you have any questions, ask your nurse or doctor.               Start taking these medicines.        Dose/Directions    omeprazole 20 MG CR capsule   Commonly known as:  priLOSEC   Used for:  Gastroesophageal reflux disease without esophagitis   Started by:  Rina Bailey MD        Dose:  20 mg   Take 1 capsule (20 mg) by mouth daily   Quantity:  30 capsule   Refills:  3            Where to get your medicines      These medications were sent to [id number] - [address] [address]     Phone:  [phone]     omeprazole 20 MG CR capsule                Primary Care Provider Office Phone # Fax #    Agnieszka Bowman PA-C [phone]       [address] [address]        Equal Access to Services     GENE MEDINA AH: Ana wrighto Somelanie, waaxda luqadaha, qaybta kaalmada adeegyada, gen dockery. So [facility] [phone].    ATENCIÓN: Si habla español, tiene a foley disposición servicios gratuitos de asistencia lingüística. Llame al [phone].    We comply with applicable federal civil rights laws and [place] laws. We do not discriminate on the basis of race, color, national origin, age, disability, sex, sexual orientation, or gender identity.            Thank you!     Thank " you for choosing [facility]  for your care. Our goal is always to provide you with excellent care. Hearing back from our patients is one way we can continue to improve our services. Please take a few minutes to complete the written survey that you may receive in the mail after your visit with us. Thank you!             Your Updated Medication List - Protect others around you: Learn how to safely use, store and throw away your medicines at www.disposemymeds.org.          This list is accurate as of 9/19/18 11:04 AM.  Always use your most recent med list.                   Brand Name Dispense Instructions for use Diagnosis    hydrOXYzine 25 MG tablet    ATARAX    60 tablet    Take 1 tablet (25 mg) by mouth At Bedtime    Rash       ketoconazole 2 % cream    NIZORAL    60 g    Apply topically daily    TV (tinea versicolor)       levothyroxine 25 MCG tablet    SYNTHROID/LEVOTHROID    45 tablet    TAKE 0.5 TABLETS (12.5 MCG) BY MOUTH DAILY    Hashimoto's disease       MAGNESIUM GLYCINATE PLUS PO      Take 3 tablets by mouth At Bedtime        MULTIVITAMIN PO           omeprazole 20 MG CR capsule    priLOSEC    30 capsule    Take 1 capsule (20 mg) by mouth daily    Gastroesophageal reflux disease without esophagitis       triamcinolone 0.1 % ointment    KENALOG    453 g    Apply topically 2 times daily    Rash       UNABLE TO FIND      Take 2 capsules by mouth every morning MEDICATION NAME: hpa Axis adrenal support supplement

## 2018-09-19 NOTE — NURSING NOTE
"Chief Complaint   Patient presents with     Abdominal Pain     Injectable Influenza Immunization Documentation    1.  Has the patient received the information for the injectable influenza vaccine? YES     2. Is the patient 6 months of age or older? YES     3. Does the patient have any of the following contraindications?         Severe allergy to eggs? No     Severe allergic reaction to previous influenza vaccines? No   Severe allergy to latex? No       History of Guillain-Lincoln syndrome? No     Currently have a temperature greater than 100.4F? No        4.  Severely egg allergic patients should have flu vaccine eligibility assessed by an MD, RN, or pharmacist, and those who received flu vaccine should be observed for 15 min by an MD, RN, Pharmacist, Medical Technician, or member of clinic staff.\": YES    5. Latex-allergic patients should be given latex-free influenza vaccine Yes. Please reference the Vaccine latex table to determine if your clinic s product is latex-containing.       Vaccination given by MARIZA Saldaña CMA          "

## 2018-09-20 ASSESSMENT — PATIENT HEALTH QUESTIONNAIRE - PHQ9: SUM OF ALL RESPONSES TO PHQ QUESTIONS 1-9: 5

## 2018-09-20 ASSESSMENT — ANXIETY QUESTIONNAIRES: GAD7 TOTAL SCORE: 13

## 2018-09-27 ENCOUNTER — OFFICE VISIT (OUTPATIENT)
Dept: URGENT CARE | Facility: URGENT CARE | Age: 33
End: 2018-09-27
Payer: COMMERCIAL

## 2018-09-27 VITALS
HEART RATE: 54 BPM | OXYGEN SATURATION: 98 % | DIASTOLIC BLOOD PRESSURE: 91 MMHG | SYSTOLIC BLOOD PRESSURE: 138 MMHG | TEMPERATURE: 98.6 F

## 2018-09-27 DIAGNOSIS — R30.0 DYSURIA: Primary | ICD-10-CM

## 2018-09-27 DIAGNOSIS — R82.90 NONSPECIFIC FINDING ON EXAMINATION OF URINE: ICD-10-CM

## 2018-09-27 LAB

## 2018-09-27 PROCEDURE — 81001 URINALYSIS AUTO W/SCOPE: CPT | Performed by: INTERNAL MEDICINE

## 2018-09-27 PROCEDURE — 87086 URINE CULTURE/COLONY COUNT: CPT | Performed by: INTERNAL MEDICINE

## 2018-09-28 LAB
BACTERIA SPEC CULT: NORMAL
SPECIMEN SOURCE: NORMAL

## 2018-10-03 NOTE — PROGRESS NOTES
Triage note: A pleasant 32 year olfd female accompanied by her  presents to urgent care for an unrelenting periumbilical and epigastric pain present throughout the day today. No chest pain or dyspnea. She is stable and directed to [facility] ER now for evaluation and management. Patient and her  agree w this plan. Patient is stable upon departure from the urgent care.

## 2018-10-31 ENCOUNTER — OFFICE VISIT (OUTPATIENT)
Dept: FAMILY MEDICINE | Facility: CLINIC | Age: 33
End: 2018-10-31
Payer: COMMERCIAL

## 2018-10-31 VITALS
HEART RATE: 57 BPM | DIASTOLIC BLOOD PRESSURE: 80 MMHG | RESPIRATION RATE: 16 BRPM | WEIGHT: 139 LBS | BODY MASS INDEX: 22.44 KG/M2 | SYSTOLIC BLOOD PRESSURE: 112 MMHG | TEMPERATURE: 98 F | OXYGEN SATURATION: 100 %

## 2018-10-31 DIAGNOSIS — L30.9 DERMATITIS: Primary | ICD-10-CM

## 2018-10-31 PROCEDURE — 99213 OFFICE O/P EST LOW 20 MIN: CPT | Performed by: PHYSICIAN ASSISTANT

## 2018-10-31 RX ORDER — PREDNISONE 10 MG/1
10 TABLET ORAL DAILY
Qty: 5 TABLET | Refills: 0 | Status: SHIPPED | OUTPATIENT
Start: 2018-10-31 | End: 2018-11-05

## 2018-10-31 NOTE — MR AVS SNAPSHOT
After Visit Summary   10/31/2018    Shayla Nolasco    MRN: [id number]           Patient Information     Date Of Birth          1985        Visit Information        Provider Department      10/31/2018 7:50 AM Nanci Mcdaniels PA-C [facility]        Today's Diagnoses     Dermatitis    -  1       Follow-ups after your visit        Follow-up notes from your care team     Return in about 1 week (around 11/7/2018), or if symptoms worsen or fail to improve.      Who to contact     If you have questions or need follow up information about today's clinic visit or your schedule please contact [facility] directly at [phone].  Normal or non-critical lab and imaging results will be communicated to you by MyChart, letter or phone within 4 business days after the clinic has received the results. If you do not hear from us within 7 days, please contact the clinic through [email or web address] or phone. If you have a critical or abnormal lab result, we will notify you by phone as soon as possible.  Submit refill requests through [employer] or call your pharmacy and they will forward the refill request to us. Please allow 3 business days for your refill to be completed.          Additional Information About Your Visit        MyChart Information     [employer] gives you secure access to your electronic health record. If you see a primary care provider, you can also send messages to your care team and make appointments. If you have questions, please call your primary care clinic.  If you do not have a primary care provider, please call [phone] and they will assist you.        Care EveryWhere ID     This is your Care EveryWhere ID. This could be used by other organizations to access your [facility] medical records  [id number]        Your Vitals Were     Pulse Temperature Respirations Pulse Oximetry BMI (Body Mass Index)       57 98  F (36.7  C) 16 100%  22.44 kg/m2        Blood Pressure from Last 3 Encounters:   10/31/18 112/80   09/27/18 (!) 138/91   09/19/18 130/79    Weight from Last 3 Encounters:   10/31/18 139 lb (63 kg)   09/19/18 137 lb (62.1 kg)   11/28/17 136 lb 3.2 oz (61.8 kg)              We Performed the Following     DEPRESSION ACTION PLAN (DAP)          Today's Medication Changes          These changes are accurate as of 10/31/18  8:32 AM.  If you have any questions, ask your nurse or doctor.               Start taking these medicines.        Dose/Directions    predniSONE 10 MG tablet   Commonly known as:  DELTASONE   Used for:  Dermatitis   Started by:  Nanci Mcdaniels PA-C        Dose:  10 mg   Take 1 tablet (10 mg) by mouth daily for 5 days   Quantity:  5 tablet   Refills:  0            Where to get your medicines      These medications were sent to [facility]/pharmacy #[id number] - [place], [place] - [address] [facility]  [address] [facility] [address] [facility] [address]     Phone:  [phone]     predniSONE 10 MG tablet                Primary Care Provider Office Phone # Fax #    Agnieszka Cherelle Bowman PA-C [phone]       [address]  [facility] [address]        Equal Access to Services     MITA MEDINA AH: Hadii karen ku hadasho Soomaali, waaxda luqadaha, qaybta kaalmada adeegyada, waxay darrenin maurice dockery. So [facility] [phone].    ATENCIÓN: Si habla español, tiene a foley disposición servicios gratuitos de asistencia lingüística. Llame al [phone].    We comply with applicable federal civil rights laws and [place] laws. We do not discriminate on the basis of race, color, national origin, age, disability, sex, sexual orientation, or gender identity.            Thank you!     Thank you for choosing [facility]  for your care. Our goal is always to provide you with excellent care. Hearing back from our patients is one way we can continue to improve our services. Please take a  few minutes to complete the written survey that you may receive in the mail after your visit with us. Thank you!             Your Updated Medication List - Protect others around you: Learn how to safely use, store and throw away your medicines at www.disposemymeds.org.          This list is accurate as of 10/31/18  8:32 AM.  Always use your most recent med list.                   Brand Name Dispense Instructions for use Diagnosis    hydrOXYzine 25 MG tablet    ATARAX    60 tablet    Take 1 tablet (25 mg) by mouth At Bedtime    Rash       ketoconazole 2 % cream    NIZORAL    60 g    Apply topically daily    TV (tinea versicolor)       levothyroxine 25 MCG tablet    SYNTHROID/LEVOTHROID    45 tablet    TAKE 0.5 TABLETS (12.5 MCG) BY MOUTH DAILY    Hashimoto's disease       MAGNESIUM GLYCINATE PLUS PO      Take 3 tablets by mouth At Bedtime        MULTIVITAMIN PO           omeprazole 20 MG CR capsule    priLOSEC    30 capsule    Take 1 capsule (20 mg) by mouth daily    Gastroesophageal reflux disease without esophagitis       predniSONE 10 MG tablet    DELTASONE    5 tablet    Take 1 tablet (10 mg) by mouth daily for 5 days    Dermatitis       triamcinolone 0.1 % ointment    KENALOG    453 g    Apply topically 2 times daily    Rash       UNABLE TO FIND      Take 2 capsules by mouth every morning MEDICATION NAME: hpa Axis adrenal support supplement

## 2018-10-31 NOTE — LETTER
My Depression Action Plan  Name: Shayla Nolasco   Date of Birth 1985  Date: 10/31/2018    My doctor: Agnieszka Bowman   My clinic: [address]  [facility] [address] [phone]          GREEN    ZONE   Good Control    What it looks like:     Things are going generally well. You have normal up s and down s. You may even feel depressed from time to time, but bad moods usually last less than a day.   What you need to do:  1. Continue to care for yourself (see self care plan)  2. Check your depression survival kit and update it as needed  3. Follow your physician s recommendations including any medication.  4. Do not stop taking medication unless you consult with your physician first.           YELLOW         ZONE Getting Worse    What it looks like:     Depression is starting to interfere with your life.     It may be hard to get out of bed; you may be starting to isolate yourself from others.    Symptoms of depression are starting to last most all day and this has happened for several days.     You may have suicidal thoughts but they are not constant.   What you need to do:     1. Call your care team, your response to treatment will improve if you keep your care team informed of your progress. Yellow periods are signs an adjustment may need to be made.     2. Continue your self-care, even if you have to fake it!    3. Talk to someone in your support network    4. Open up your depression survival kit           RED    ZONE Medical Alert - Get Help    What it looks like:     Depression is seriously interfering with your life.     You may experience these or other symptoms: You can t get out of bed most days, can t work or engage in other necessary activities, you have trouble taking care of basic hygiene, or basic responsibilities, thoughts of suicide or death that will not go away, self-injurious behavior.     What  you need to do:  1. Call your care team and request a same-day appointment. If they are not available (weekends or after hours) call your local crisis line, emergency room or 911.            Depression Self Care Plan / Survival Kit    Self-Care for Depression  Here s the deal. Your body and mind are really not as separate as most people think.  What you do and think affects how you feel and how you feel influences what you do and think. This means if you do things that people who feel good do, it will help you feel better.  Sometimes this is all it takes.  There is also a place for medication and therapy depending on how severe your depression is, so be sure to consult with your medical provider and/ or Behavioral Health Consultant if your symptoms are worsening or not improving.     In order to better manage my stress, I will:    Exercise  Get some form of exercise, every day. This will help reduce pain and release endorphins, the  feel good  chemicals in your brain. This is almost as good as taking antidepressants!  This is not the same as joining a gym and then never going! (they count on that by the way ) It can be as simple as just going for a walk or doing some gardening, anything that will get you moving.      Hygiene   Maintain good hygiene (Get out of bed in the morning, Make your bed, Brush your teeth, Take a shower, and Get dressed like you were going to work, even if you are unemployed).  If your clothes don't fit try to get ones that do.    Diet  I will strive to eat foods that are good for me, drink plenty of water, and avoid excessive sugar, caffeine, alcohol, and other mood-altering substances.  Some foods that are helpful in depression are: complex carbohydrates, B vitamins, flaxseed, fish or fish oil, fresh fruits and vegetables.    Psychotherapy  I agree to participate in Individual Therapy (if recommended).    Medication  If prescribed medications, I agree to take them.  Missing doses can result  in serious side effects.  I understand that drinking alcohol, or other illicit drug use, may cause potential side effects.  I will not stop my medication abruptly without first discussing it with my provider.    Staying Connected With Others  I will stay in touch with my friends, family members, and my primary care provider/team.    Use your imagination  Be creative.  We all have a creative side; it doesn t matter if it s oil painting, sand castles, or mud pies! This will also kick up the endorphins.    Witness Beauty  (AKA stop and smell the roses) Take a look outside, even in mid-winter. Notice colors, textures. Watch the squirrels and birds.     Service to others  Be of service to others.  There is always someone else in need.  By helping others we can  get out of ourselves  and remember the really important things.  This also provides opportunities for practicing all the other parts of the program.    Humor  Laugh and be silly!  Adjust your TV habits for less news and crime-drama and more comedy.    Control your stress  Try breathing deep, massage therapy, biofeedback, and meditation. Find time to relax each day.     My support system    Clinic Contact:  Phone number:    Contact 1:  Phone number:    Contact 2:  Phone number:    Zoroastrianism/:  Phone number:    Therapist:  Phone number:    Local crisis center:    Phone number:    Other community support:  Phone number:

## 2018-10-31 NOTE — PROGRESS NOTES
SUBJECTIVE:   Shayla Nolasco is a 32 year old female who presents to clinic today for the following health issues:      Eye(s) Problem      Duration: ALMOST A WEEK    Description:  Location: right around outside of eye and now left seems to maybe be starting  Pain: no  Redness: YES  Discharge: no     Accompanying signs and symptoms: itching    History (Trauma, foreign body exposure,): None    Precipitating or alleviating factors (contact use): None    Therapies tried and outcome: None      HPI additional notes:   Chief Complaint   Patient presents with     Eye Problem     Shayla presents today with eye rash. Sx started on Rt eyelids 5 days ago, now present on Lt as well. Patient left mascara on overnight and woke with irritated, itchy rash on Rt eyelids. Took some Benadryl without relief. Was without mascara for two day, wore some yesterday and now symptoms worsening again. Denies f/c/s, vision change, discharge, rhinorrhea, foreign body sensation, dizziness or lightheadedness, HA.     ROS:    A Comprehensive greater than 10 system review of systems was carried out.    Pertinent positives and negatives are noted above.  Otherwise negative for contributory information.      Chart Review:  History   Smoking Status     Never Smoker   Smokeless Tobacco     Never Used       Patient Active Problem List   Diagnosis     QUEENIE III (cervical intraepithelial neoplasia III)     Family history of ovarian cancer     Family history of BRCA gene positive     Mixed anxiety depressive disorder     Hashimoto's disease     Gastroesophageal reflux disease without esophagitis     Past Surgical History:   Procedure Laterality Date     [facility] SURGICAL PATHOLOGY  10/2007    LEEP     [facility] SURGICAL PATHOLOGY  9/2007     Problem list, Medication list, Allergies, Medical/Social/Surg hx reviewed in [employer], updated as appropriate.   OBJECTIVE:                                                    /80  Pulse 57  Temp 98  F (36.7  C)  Resp  16  Wt 139 lb (63 kg)  SpO2 100%  BMI 22.44 kg/m2  Body mass index is 22.44 kg/(m^2).  GENERAL: WDWN, no acute distress  PSYCH: pleasant, cooperative  EYES: Well-demarcated, pink, scaly patches scattered along bilat eyelid margins. PERRL, EOMI. No injection, no discharge.  HENT:  Normocephalic. Moist mucus membranes.  NECK:  Supple, symmetric  EXTREMITIES:  No gross deformities, moves all 4 limbs spontaneously  SKIN:  Warm and dry, no rash or suspicious lesions    NEUROLOGIC: alert, sensation grossly intact.    Diagnostic test results: none     ASSESSMENT/PLAN:                                                          ICD-10-CM    1. Dermatitis L30.9 predniSONE (DELTASONE) 10 MG tablet     Rash consistent with contact/irritant dermatitis, likely from mascara. Discontinue use of mascara and do not try different brand until rash resolves. Given sensitive location, would avoid topical treatments. Given lack of response to OTC antihistamine, will treat with low-dose prednisone burst: 10 mg every day x5 days. Avoid rubbing as this will make rash worse. May continue antihistamine to help minimize itch. Apply cool compress to minimize itch as well.    Please see patient instructions for treatment details.    Follow up in 7-10 days if not improving as anticipated, sooner PRN.    Nanci Mcdaniels PA-C  [facility]

## 2018-11-26 ENCOUNTER — E-VISIT (OUTPATIENT)
Dept: FAMILY MEDICINE | Facility: CLINIC | Age: 33
End: 2018-11-26
Payer: COMMERCIAL

## 2018-11-26 ENCOUNTER — TELEPHONE (OUTPATIENT)
Dept: FAMILY MEDICINE | Facility: CLINIC | Age: 33
End: 2018-11-26

## 2018-11-26 DIAGNOSIS — L24.3 IRRITANT CONTACT DERMATITIS DUE TO COSMETICS: Primary | ICD-10-CM

## 2018-11-26 DIAGNOSIS — L30.9 DERMATITIS: ICD-10-CM

## 2018-11-26 PROCEDURE — 99444 ZZC PHYSICIAN ONLINE EVALUATION & MANAGEMENT SERVICE: CPT | Performed by: PHYSICIAN ASSISTANT

## 2018-11-26 RX ORDER — PREDNISONE 10 MG/1
10 TABLET ORAL DAILY
Qty: 5 TABLET | Refills: 0 | Status: CANCELLED | OUTPATIENT
Start: 2018-11-26

## 2018-11-26 RX ORDER — PREDNISONE 20 MG/1
10 TABLET ORAL DAILY
Qty: 3 TABLET | Refills: 0 | Status: SHIPPED | OUTPATIENT
Start: 2018-11-26 | End: 2018-12-01

## 2018-11-26 NOTE — TELEPHONE ENCOUNTER
Reason for call:  Patient reporting a symptom    Symptom or request: itchiness and eye swelling    Duration (how long have symptoms been present): 2 days    Have you been treated for this before? Yes    Additional comments: Pt was treated for this before with predinisone.  Symptoms improved.  Now it has come back again.  She now knows it was a reaction from some eye cream.  She would like  Refill on the prednisone.     Phone Number patient can be reached at:  Cell number on file:    Telephone Information:   Mobile [phone]       Best Time:  any    Can we leave a detailed message on this number:  YES    Call taken on 11/26/2018 at 8:23 AM by LINDA CAMPOS

## 2018-11-26 NOTE — TELEPHONE ENCOUNTER
Patient called reporting mainly right eye but both eyes are effected     Eye(s) Problem  Onset: Thursday 11/22/18    Description:   Location: right  Pain: no   Redness: YES    Accompanying Signs & Symptoms:  Discharge/mattering: no   Swelling: YES  Visual changes: no   Fever: no   Nasal Congestion: no   Bothered by bright lights: no     History:   Trauma: no   Foreign body exposure: no     Precipitating factors:   Wearing contacts: no     Alleviating factors:  Improved by: stopping eye cream/mascara    Therapies Tried and outcome: stopping eye cream, contact dermatitis she thinks from using the eye cream     Recent Medication changes: none identified           References used: Telephone Triage Protocols for Nurses fifth edition       Routing  to provider for review/advice because:  Patient is requesting prednisone refill. She was seen in October for this same issue and thinks that it is from the eye cream she had been using        SHARYN MurdockN RN  Triage RN  [facility]

## 2018-11-26 NOTE — PATIENT INSTRUCTIONS
"  Contact Dermatitis  Contact dermatitis is a skin rash caused by something that touches the skin and makes it irritated and inflamed. Your skin may be red, swollen, dry, and may be cracked. Blisters may form and ooze. The rash will itch.  Contact dermatitis can form on the face and neck, backs of hands, forearms, genitals, and lower legs.  People can get contact dermatitis from lots of sources. These include:    Plants such as poison ivy, oak, or sumac    Chemicals in hair dyes and rinses, soaps, solvents, waxes, fingernail polish, and deodorants     Jewelry or watchbands made of nickel  Contact dermatitis is not passed from person to person.  Talk with your healthcare provider about what may have caused the rash. A type of allergy testing called \"patch testing\" may be used to discover what you are allergic to. You will need to avoid the source of your rash in the future to prevent it from coming back.  Treatment is done to relieve itching and prevent the rash from coming back. The rash should go away in a few days to a few weeks.  Home care  Your healthcare provider may prescribe medicine to relieve swelling and itching. Follow all instructions when using these medicines.  General care:    Avoid anything that heats up your skin, such as hot showers or baths, or direct sunlight. This can make itching worse.    Apply cold compresses to soothe your sores to help relieve your symptoms. Do this for 30 minutes 3 to 4 times a day. You can make a cold compress by soaking a cloth in cold water. Squeeze out excess water. You can add colloidal oatmeal to the water to help reduce itching. For severe itching in a small area, apply an ice pack wrapped in a thin towel. Do this for 20 minutes 3 to 4 times a day.    You can also try wet dressings. One way to do this is to wear a wet piece of clothing under a dry one. Wear a damp shirt under a dry shirt if your upper body is affected. This can relieve itching and prevent you from " scratching the affected area.    You can also help relieve large areas of itching by taking a lukewarm bath with colloidal oatmeal added to the water.    Use hydrocortisone cream for redness and irritation, unless another medicine was prescribed. You can also use benzocaine anesthetic cream or spray. Calamine lotion can also relieve mild symptoms.    Use oral diphenhydramine to help reduce itching. You can buy this antihistamine at drug and grocery stores. It can make you sleepy, so use lower doses during the daytime. Or you can use loratadine. This is an antihistamine that will not make you sleepy. Do not use diphenhydramine if you have glaucoma or have trouble urinating due to an enlarged prostate.    If a plant causes your rash, make sure to wash your skin and the clothes you were wearing when you came into contact with the plant. This is to wash away the plant oils that gave you the rash and prevent more or worse symptoms.    Stay away from the substance or object that causes your symptoms. If you can t avoid it, wear gloves or some other type of protection.  Follow-up care  Follow up with your healthcare provider, or as advised.  When to seek medical advice  Call your healthcare provider right away if any of these occur:    Spreading of the rash to other parts of your body    Severe swelling of your face, eyelids, mouth, throat or tongue    Trouble urinating due to swelling in the genital area    Fever of 100.4 F (38 C) or higher    Redness or swelling that gets worse    Pain that gets worse    Foul-smelling fluid leaking from the skin    Yellow-brown crusts on the open blisters  Date Last Reviewed: 9/1/2016 2000-2018 [employer]. [address] [place] [id number]. All rights reserved. This information is not intended as a substitute for professional medical care. Always follow your healthcare professional's instructions.

## 2018-11-26 NOTE — MR AVS SNAPSHOT
"              After Visit Summary   11/26/2018    Shayla Nolasco    MRN: [id number]           Patient Information     Date Of Birth          1985        Visit Information        Provider Department      11/26/2018 10:07 AM Nanci Mcdaniels PA-C [facility]        Today's Diagnoses     Irritant contact dermatitis due to cosmetics    -  1      Care Instructions      Contact Dermatitis  Contact dermatitis is a skin rash caused by something that touches the skin and makes it irritated and inflamed. Your skin may be red, swollen, dry, and may be cracked. Blisters may form and ooze. The rash will itch.  Contact dermatitis can form on the face and neck, backs of hands, forearms, genitals, and lower legs.  People can get contact dermatitis from lots of sources. These include:    Plants such as poison ivy, oak, or sumac    Chemicals in hair dyes and rinses, soaps, solvents, waxes, fingernail polish, and deodorants     Jewelry or watchbands made of nickel  Contact dermatitis is not passed from person to person.  Talk with your healthcare provider about what may have caused the rash. A type of allergy testing called \"patch testing\" may be used to discover what you are allergic to. You will need to avoid the source of your rash in the future to prevent it from coming back.  Treatment is done to relieve itching and prevent the rash from coming back. The rash should go away in a few days to a few weeks.  Home care  Your healthcare provider may prescribe medicine to relieve swelling and itching. Follow all instructions when using these medicines.  General care:    Avoid anything that heats up your skin, such as hot showers or baths, or direct sunlight. This can make itching worse.    Apply cold compresses to soothe your sores to help relieve your symptoms. Do this for 30 minutes 3 to 4 times a day. You can make a cold compress by soaking a cloth in cold water. Squeeze out excess water. " You can add colloidal oatmeal to the water to help reduce itching. For severe itching in a small area, apply an ice pack wrapped in a thin towel. Do this for 20 minutes 3 to 4 times a day.    You can also try wet dressings. One way to do this is to wear a wet piece of clothing under a dry one. Wear a damp shirt under a dry shirt if your upper body is affected. This can relieve itching and prevent you from scratching the affected area.    You can also help relieve large areas of itching by taking a lukewarm bath with colloidal oatmeal added to the water.    Use hydrocortisone cream for redness and irritation, unless another medicine was prescribed. You can also use benzocaine anesthetic cream or spray. Calamine lotion can also relieve mild symptoms.    Use oral diphenhydramine to help reduce itching. You can buy this antihistamine at drug and grocery stores. It can make you sleepy, so use lower doses during the daytime. Or you can use loratadine. This is an antihistamine that will not make you sleepy. Do not use diphenhydramine if you have glaucoma or have trouble urinating due to an enlarged prostate.    If a plant causes your rash, make sure to wash your skin and the clothes you were wearing when you came into contact with the plant. This is to wash away the plant oils that gave you the rash and prevent more or worse symptoms.    Stay away from the substance or object that causes your symptoms. If you can t avoid it, wear gloves or some other type of protection.  Follow-up care  Follow up with your healthcare provider, or as advised.  When to seek medical advice  Call your healthcare provider right away if any of these occur:    Spreading of the rash to other parts of your body    Severe swelling of your face, eyelids, mouth, throat or tongue    Trouble urinating due to swelling in the genital area    Fever of 100.4 F (38 C) or higher    Redness or swelling that gets worse    Pain that gets worse    Foul-smelling  fluid leaking from the skin    Yellow-brown crusts on the open blisters  Date Last Reviewed: 9/1/2016 2000-2018 [employer], [employer] [address] [place] [address], [place] [address]. All rights reserved. This information is not intended as a substitute for professional medical care. Always follow your healthcare professional's instructions.                Follow-ups after your visit        Follow-up notes from your care team     Return in about 1 week (around 12/3/2018), or if symptoms worsen or fail to improve.      Your next 10 appointments already scheduled     Nov 29, 2018  7:20 AM CST   New Patient with Agnieszka Bowman PA-C   [facility] ([facility])    [address] [place] [id number] [phone]              Who to contact     If you have questions or need follow up information about today's clinic visit or your schedule please contact [facility] directly at [phone].  Normal or non-critical lab and imaging results will be communicated to you by [email or web address], letter or phone within 4 business days after the clinic has received the results. If you do not hear from us within 7 days, please contact the clinic through Blazentt or phone. If you have a critical or abnormal lab result, we will notify you by phone as soon as possible.  Submit refill requests through [employer]zup or call your pharmacy and they will forward the refill request to us. Please allow 3 business days for your refill to be completed.          Additional Information About Your Visit        [email or web address] Information     [employer] gives you secure access to your electronic health record. If you see a primary care provider, you can also send messages to your care team and make appointments. If you have questions, please call your primary care clinic.  If you do not have a primary care provider, please call [phone] and they will assist you.         Care EveryWhere ID     This is your Care EveryWhere ID. This could be used by other organizations to access your [place] medical records  [id number]         Blood Pressure from Last 3 Encounters:   10/31/18 112/80   09/27/18 (!) 138/91   09/19/18 130/79    Weight from Last 3 Encounters:   10/31/18 139 lb (63 kg)   09/19/18 137 lb (62.1 kg)   11/28/17 136 lb 3.2 oz (61.8 kg)              Today, you had the following     No orders found for display         Today's Medication Changes          These changes are accurate as of 11/26/18 10:28 AM.  If you have any questions, ask your nurse or doctor.               Start taking these medicines.        Dose/Directions    predniSONE 20 MG tablet   Commonly known as:  DELTASONE   Used for:  Irritant contact dermatitis due to cosmetics   Started by:  Nanci Mcdaniels PA-C        Dose:  10 mg   Take 0.5 tablets (10 mg) by mouth daily for 5 days   Quantity:  3 tablet   Refills:  0            Where to get your medicines      These medications were sent to [facility]/pharmacy #[id number] - [place] [id number] [place] [id number] [facility] [address]     Phone:  [phone]     predniSONE 20 MG tablet                Primary Care Provider Office Phone # Fax #    Agnieszka Cherelle Bowman PA-C [phone]       [address] [address]        Equal Access to Services     [facility] Center[facility]FIA AH: Hadii karen ku hadasho Soomaali, waaxda luqadaha, qaybta kaalmada adeegyada, gen jamison . So [facility] [phone].    ATENCIÓN: Si habla español, tiene a foley disposición servicios gratuitos de asistencia lingüística. Llame al [phone].    We comply with applicable federal civil rights laws and [place] laws. We do not discriminate on the basis of race, color, national origin, age, disability, sex, sexual orientation, or gender identity.            Thank you!     Thank you for choosing [facility]  for your care. Our goal is always to provide you with excellent care. Hearing back from our patients is one way we can continue to improve our services. Please take a few minutes to complete the written survey that you may receive in the mail after your visit with us. Thank you!             Your Updated Medication List - Protect others around you: Learn how to safely use, store and throw away your medicines at www.disposemymeds.org.          This list is accurate as of 11/26/18 10:28 AM.  Always use your most recent med list.                   Brand Name Dispense Instructions for use Diagnosis    hydrOXYzine 25 MG tablet    ATARAX    60 tablet    Take 1 tablet (25 mg) by mouth At Bedtime    Rash       ketoconazole 2 % cream    NIZORAL    60 g    Apply topically daily    TV (tinea versicolor)       levothyroxine 25 MCG tablet    SYNTHROID/LEVOTHROID    45 tablet    TAKE 0.5 TABLETS (12.5 MCG) BY MOUTH DAILY    Hashimoto's disease       MAGNESIUM GLYCINATE PLUS PO      Take 3 tablets by mouth At Bedtime        MULTIVITAMIN PO           omeprazole 20 MG CR capsule    priLOSEC    30 capsule    Take 1 capsule (20 mg) by mouth daily    Gastroesophageal reflux disease without esophagitis       predniSONE 20 MG tablet    DELTASONE    3 tablet    Take 0.5 tablets (10 mg) by mouth daily for 5 days    Irritant contact dermatitis due to cosmetics       triamcinolone 0.1 % ointment    KENALOG    453 g    Apply topically 2 times daily    Rash       UNABLE TO FIND      Take 2 capsules by mouth every morning MEDICATION NAME: hpa Axis adrenal support supplement

## 2018-12-11 ENCOUNTER — OFFICE VISIT (OUTPATIENT)
Dept: FAMILY MEDICINE | Facility: CLINIC | Age: 33
End: 2018-12-11
Payer: COMMERCIAL

## 2018-12-11 VITALS
OXYGEN SATURATION: 100 % | BODY MASS INDEX: 22.03 KG/M2 | HEART RATE: 48 BPM | SYSTOLIC BLOOD PRESSURE: 103 MMHG | DIASTOLIC BLOOD PRESSURE: 62 MMHG | WEIGHT: 136.5 LBS | TEMPERATURE: 98 F

## 2018-12-11 DIAGNOSIS — E61.1 IRON DEFICIENCY: ICD-10-CM

## 2018-12-11 DIAGNOSIS — Z12.4 SCREENING FOR MALIGNANT NEOPLASM OF CERVIX: ICD-10-CM

## 2018-12-11 DIAGNOSIS — F41.1 GAD (GENERALIZED ANXIETY DISORDER): ICD-10-CM

## 2018-12-11 DIAGNOSIS — R10.13 EPIGASTRIC PAIN: ICD-10-CM

## 2018-12-11 DIAGNOSIS — E56.9 VITAMIN DEFICIENCY: ICD-10-CM

## 2018-12-11 DIAGNOSIS — Z00.00 ROUTINE GENERAL MEDICAL EXAMINATION AT A HEALTH CARE FACILITY: Primary | ICD-10-CM

## 2018-12-11 DIAGNOSIS — E06.3 HASHIMOTO'S THYROIDITIS: ICD-10-CM

## 2018-12-11 DIAGNOSIS — F50.9 EATING DISORDER: ICD-10-CM

## 2018-12-11 PROCEDURE — 99395 PREV VISIT EST AGE 18-39: CPT | Performed by: PHYSICIAN ASSISTANT

## 2018-12-11 PROCEDURE — 99214 OFFICE O/P EST MOD 30 MIN: CPT | Mod: 25 | Performed by: PHYSICIAN ASSISTANT

## 2018-12-11 PROCEDURE — 87624 HPV HI-RISK TYP POOLED RSLT: CPT | Performed by: PHYSICIAN ASSISTANT

## 2018-12-11 PROCEDURE — G0145 SCR C/V CYTO,THINLAYER,RESCR: HCPCS | Performed by: PHYSICIAN ASSISTANT

## 2018-12-11 NOTE — PATIENT INSTRUCTIONS
Continue taking mulitvitamin  Decrease exercise to no more than 50 min per day, and at least 1 rest day. Try for 2   Ginseng 500 mg daily for 2 weeks on, 1 week off  Start seeing a therapist  Recheck in 4-6 weeks  50 grams of fat daily minimum  Return to clinic for any new or worsening symptoms or go to ER Urgent care in off hours      Preventive Health Recommendations  Female Ages 26 - 39  Yearly exam:   See your health care provider every year in order to    Review health changes.     Discuss preventive care.      Review your medicines if you your doctor has prescribed any.    Until age 30: Get a Pap test every three years (more often if you have had an abnormal result).    After age 30: Talk to your doctor about whether you should have a Pap test every 3 years or have a Pap test with HPV screening every 5 years.   You do not need a Pap test if your uterus was removed (hysterectomy) and you have not had cancer.  You should be tested each year for STDs (sexually transmitted diseases), if you're at risk.   Talk to your provider about how often to have your cholesterol checked.  If you are at risk for diabetes, you should have a diabetes test (fasting glucose).  Shots: Get a flu shot each year. Get a tetanus shot every 10 years.   Nutrition:     Eat at least 5 servings of fruits and vegetables each day.    Eat whole-grain bread, whole-wheat pasta and brown rice instead of white grains and rice.    Get adequate Calcium and Vitamin D.     Lifestyle    Exercise at least 150 minutes a week (30 minutes a day, 5 days of the week). This will help you control your weight and prevent disease.    Limit alcohol to one drink per day.    No smoking.     Wear sunscreen to prevent skin cancer.    See your dentist every six months for an exam and cleaning.

## 2018-12-11 NOTE — PROGRESS NOTES
SUBJECTIVE:   CC: Shayla Nolasco is an 32 year old woman who presents for preventive health visit.     Healthy Habits:    Do you get at least three servings of calcium containing foods daily (dairy, green leafy vegetables, etc.)? yes    Amount of exercise or daily activities, outside of work: 5-6 day(s) per week    Problems taking medications regularly No    Medication side effects: No    Have you had an eye exam in the past two years? no    Do you see a dentist twice per year? yes    Do you have sleep apnea, excessive snoring or daytime drowsiness?no    Psych  -Patient is experiencing anxiety/depression    -She doesn't feel like her stress level is too high  -Patient reports having a history of body image disorders  -She sought counseling in the past but it was unhelpful as it caused her additional stress    Neuro  -Patient has not been getting 8 hours of sleep  -She reports waking up in the middle of the night and has difficult getting back to sleep  -Patient does not have difficulty getting to sleep, her problem is with staying asleep    GI  -She started having painful stomach aches after eating food starting in September  -Patient was prescribed Omeprazole which was unhelpful  -She ended up seeing urgent care who recommended she go the ER, she didn't follow through  -Patient's symptoms eventually subsided and she has not had problems since that time  -She relates feeling particularly stressed when the stomach pains occurred   -She has taken a probiotic since that time      -Her menstrual periods have been irregular  -She has had issues with cramping  -Patient has been spotting before she gets her menstrual period  -She has not been taking birth control since 2017    Neck  -She is worried that she has thyroid issues  -Patient took her Levothyroxine this morning  -She reports feeling either too warm or too cold, as though she can't regulate her temperature well  -She feels flushed, too warm  -Patient has  "night sweats  -She has been experiencing dysphagia for years, it can be painful and is worsening  -Patient finds her dysphagia is worse when eating meat  -She describes it as \"her esophagus cannot push the food down\"    Diet/Exercise  -She exercises 5 or 6 days per week  -Patient's exercise is mainly through running and strength training  -She has started eating meat again after her iron levels were too low last year  -Patient tries to eat a mainly plant based diet  -She relates not eating a lot of fat in her diet    Past Medical History  -Patient has a medical history of HPV and cervical neoplasia  -She has a medical history of body image issues    Medications  -She is taking a daily multivitamin from [employer]  -She does not take extra Vitamin D supplements      Today's PHQ-2 Score:   PHQ-2 ( 1999 [employer]) 12/11/2018 3/18/2016   Q1: Little interest or pleasure in doing things 1 0   Q2: Feeling down, depressed or hopeless 2 2   PHQ-2 Score 3 2       Abuse: Current or Past(Physical, Sexual or Emotional)- No  Do you feel safe in your environment? Yes    Social History     Tobacco Use     Smoking status: Never Smoker     Smokeless tobacco: Never Used   Substance Use Topics     Alcohol use: Yes     Alcohol/week: 2.5 oz     Types: 5 Glasses of wine per week     If you drink alcohol do you typically have >3 drinks per day or >7 drinks per week? No                     Reviewed orders with patient.  Reviewed health maintenance and updated orders accordingly - Yes  Labs reviewed in EPIC  Patient Active Problem List   Diagnosis     QUEENIE III (cervical intraepithelial neoplasia III)     Family history of ovarian cancer     Family history of BRCA gene positive     Mixed anxiety depressive disorder     Hashimoto's disease     Gastroesophageal reflux disease without esophagitis     Past Surgical History:   Procedure Laterality Date     CL AFF SURGICAL PATHOLOGY  10/2007    LEEP     CL AFF SURGICAL PATHOLOGY  9/2007       Social " History     Tobacco Use     Smoking status: Never Smoker     Smokeless tobacco: Never Used   Substance Use Topics     Alcohol use: Yes     Alcohol/week: 2.5 oz     Types: 5 Glasses of wine per week     Family History   Problem Relation Age of Onset     Diabetes Mother         IDDM (type one)     Cancer Mother 51        Ovarian Cancer (3 times)     Diabetes Maternal Grandmother         IDDM (adult onset) Type 2     Hypertension Maternal Grandmother      Prostate Cancer Paternal Grandfather      Cancer - colorectal Paternal Grandfather         age 70's (had stomach Ca as a younger man)     Neurologic Disorder Father         migraines     Gastrointestinal Disease Father         stomach ulcers     Lipids Maternal Grandfather         high cholesterol     Circulatory Maternal Grandfather         blood clots     C.A.D. Maternal Grandfather         MI     Skin Cancer No family hx of         no skin cancer         Current Outpatient Medications   Medication Sig Dispense Refill     levothyroxine (SYNTHROID/LEVOTHROID) 25 MCG tablet TAKE 0.5 TABLETS (12.5 MCG) BY MOUTH DAILY 45 tablet 1     Multiple Vitamins-Minerals (MULTIVITAMIN OR)        Probiotic Product (PROBIOTIC PO)        hydrOXYzine (ATARAX) 25 MG tablet Take 1 tablet (25 mg) by mouth At Bedtime (Patient not taking: Reported on 9/19/2018) 60 tablet 0     ketoconazole (NIZORAL) 2 % cream Apply topically daily (Patient not taking: Reported on 9/19/2018) 60 g 1     MAGNESIUM GLYCINATE PLUS PO Take 3 tablets by mouth At Bedtime       omeprazole (PRILOSEC) 20 MG CR capsule Take 1 capsule (20 mg) by mouth daily (Patient not taking: Reported on 10/31/2018) 30 capsule 3     triamcinolone (KENALOG) 0.1 % ointment Apply topically 2 times daily (Patient not taking: Reported on 9/19/2018) 453 g 0     UNABLE TO FIND Take 2 capsules by mouth every morning MEDICATION NAME: hpa Axis adrenal support supplement         Mammogram not appropriate for this patient based on  age.    Pertinent mammograms are reviewed under the imaging tab.  History of abnormal Pap smear: YES - updated in Problem List and Health Maintenance accordingly  PAP / HPV Latest Ref Rng & Units 4/1/2015 1/2/2014 12/12/2012   PAP - NIL NIL NIL   HPV 16 DNA NEG Negative - -   HPV 18 DNA NEG Negative - -   OTHER HR HPV NEG Negative - -     Reviewed and updated as needed this visit by clinical staff  Tobacco  Allergies  Meds  Med Hx  Surg Hx  Fam Hx  Soc Hx        Reviewed and updated as needed this visit by Provider        Past Medical History:   Diagnosis Date     ASCUS favoring dysplasia 9/2011    colp - QUEENIE I     QUEENIE III (cervical intraepithelial neoplasia III) 10/07    Had a LEEP then at at first visit with us has had one NIL pap in 2/08; LSIL PAP in 7/08 and colp with Negative biopses, first diagnostic PAP 1/09:  NIL      Past Surgical History:   Procedure Laterality Date     CL AFF SURGICAL PATHOLOGY  10/2007    LEEP     CL AFF SURGICAL PATHOLOGY  9/2007       ROS:  CONSTITUTIONAL: NEGATIVE for fever, change in weight, POSITIVE chills, see HPI above  INTEGUMENTARU/SKIN: NEGATIVE for worrisome rashes, moles or lesions  EYES: NEGATIVE for vision changes or irritation  ENT: NEGATIVE for ear, mouth problems, POSITIVE throat problems, see HPI above  RESP: NEGATIVE for significant cough or SOB  BREAST: NEGATIVE for masses, tenderness or discharge  CV: NEGATIVE for chest pain, palpitations or peripheral edema  GI: NEGATIVE for nausea, heartburn, or change in bowel habits, POSITIVE abdominal pain  : NEGATIVE for unusual urinary or vaginal symptoms. Periods are irregular, see HPI above  MUSCULOSKELETAL: NEGATIVE for significant arthralgias or myalgia, see HPI above  NEURO: NEGATIVE for weakness, dizziness or paresthesias  PSYCHIATRIC: POSITIVE for changes in mood or affect    This document serves as a record of the services and decisions personally performed and made by Agnieszka Bowman PA-C. It was created on  her behalf by Kerwin Coffey, trained medical scribe. The creation of this document is based on the provider's statements to the medical scribe.  Kerwin Coffey 7:42 AM December 11, 2018  OBJECTIVE:   /62 (BP Location: Left arm, Patient Position: Sitting, Cuff Size: Adult Regular)   Pulse (!) 48   Temp 98  F (36.7  C) (Oral)   Wt 61.9 kg (136 lb 8 oz)   LMP 11/19/2018 (Exact Date)   SpO2 100%   BMI 22.03 kg/m    EXAM:  GENERAL: healthy, alert and no distress  EYES: Eyes grossly normal to inspection, PERRL and conjunctivae and sclerae normal  HENT: ear canals and TM's normal, nose and mouth without ulcers or lesions  NECK: no adenopathy, no asymmetry, masses, or scars and thyroid normal to palpation  RESP: lungs clear to auscultation - no rales, rhonchi or wheezes  BREAST: normal without masses, tenderness or nipple discharge and no palpable axillary masses or adenopathy  CV: regular rate and rhythm, normal S1 S2, no S3 or S4, no murmur, click or rub, no peripheral edema and peripheral pulses strong  ABDOMEN: soft, nontender, no hepatosplenomegaly, no masses and bowel sounds normal  MS: no gross musculoskeletal defects noted, no edema  SKIN: no suspicious lesions or rashes  : Normal external genitalia without lesions. Cervix with small amount of bloody discharge, non-tender. PAP done.   NEURO: Normal strength and tone, mentation intact and speech normal  PSYCH: mentation appears normal, affect normal/bright        ASSESSMENT/PLAN:       ICD-10-CM    1. Routine general medical examination at a health care facility Z00.00    2. Screening for malignant neoplasm of cervix Z12.4 Pap imaged thin layer screen with HPV - recommended age 30 - 65 years (select HPV order below)     HPV High Risk Types DNA Cervical   3. Hashimoto's thyroiditis E06.3 T3, Free     TSH   4. Iron deficiency E61.1 Ferritin   5. Epigastric pain R10.13 H Pylori antigen, stool   6. CATE (generalized anxiety disorder) F41.1 MENTAL HEALTH  REFERRAL  - Adult; Outpatient Treatment; Individual/Couples/Family/Group Therapy/Health Psychology; [facility]: [facility] [phone]; We will contact you to schedule the appointment or please call with any questions   7. Eating disorder F50.9 MENTAL HEALTH REFERRAL  - Adult; Outpatient Treatment; Individual/Couples/Family/Group Therapy/Health Psychology; [facility]: [facility] [phone]; We will contact you to schedule the appointment or please call with any questions   8. Vitamin deficiency E56.9 Vitamin D Deficiency     Shayla very clearly has body image issues and this is directly contributing to many of her symptoms. She is not eating enough fat to sustain a normal menstrual cycle. She heavily exercises so she is likely converting pregnenolone into cortisol instead of progesterone which is why she's spotting so much before her period. She is open to seeing a therapist to address her underlying body image issues which I think will help tremendously in the long run. For now, we'll recheck her thyroid levels in the morning before her levothyroxine dose and adjust medications as needed. Likely iron deficiency and vitamin D deficient. See plan below. Ginseng to help with mediating cortisol levels, decrease exercise intensity. Increase fat. Recheck 4-6 weeks. Return to clinic for any new or worsening symptoms or go to ER Urgent care in off hours    Patient Instructions   Continue taking mulitvitamin  Decrease exercise to no more than 50 min per day, and at least 1 rest day. Try for 2   Ginseng 500 mg daily for 2 weeks on, 1 week off  Start seeing a therapist  Recheck in 4-6 weeks  50 grams of fat daily minimum  Return to clinic for any new or worsening symptoms or go to ER Urgent care in off hours      Preventive Health Recommendations  Female Ages 26 - 39  Yearly exam:   See your health care provider every year in order to    Review health changes.     Discuss preventive care.      Review  "your medicines if you your doctor has prescribed any.    Until age 30: Get a Pap test every three years (more often if you have had an abnormal result).    After age 30: Talk to your doctor about whether you should have a Pap test every 3 years or have a Pap test with HPV screening every 5 years.   You do not need a Pap test if your uterus was removed (hysterectomy) and you have not had cancer.  You should be tested each year for STDs (sexually transmitted diseases), if you're at risk.   Talk to your provider about how often to have your cholesterol checked.  If you are at risk for diabetes, you should have a diabetes test (fasting glucose).  Shots: Get a flu shot each year. Get a tetanus shot every 10 years.   Nutrition:     Eat at least 5 servings of fruits and vegetables each day.    Eat whole-grain bread, whole-wheat pasta and brown rice instead of white grains and rice.    Get adequate Calcium and Vitamin D.     Lifestyle    Exercise at least 150 minutes a week (30 minutes a day, 5 days of the week). This will help you control your weight and prevent disease.    Limit alcohol to one drink per day.    No smoking.     Wear sunscreen to prevent skin cancer.    See your dentist every six months for an exam and cleaning.        COUNSELING:   Reviewed preventive health counseling, as reflected in patient instructions    BP Readings from Last 1 Encounters:   12/11/18 103/62     Estimated body mass index is 22.03 kg/m  as calculated from the following:    Height as of 9/19/18: 1.676 m (5' 6\").    Weight as of this encounter: 61.9 kg (136 lb 8 oz).     reports that  has never smoked. she has never used smokeless tobacco.      Counseling Resources:  ATP IV Guidelines  Pooled Cohorts Equation Calculator  Breast Cancer Risk Calculator  FRAX Risk Assessment  ICSI Preventive Guidelines  Dietary Guidelines for Americans, 2010  USDA's MyPlate  ASA Prophylaxis  Lung CA Screening    The information in this document, created by " the medical scribe for me, accurately reflects the services I personally performed and the decisions made by me. I have reviewed and approved this document for accuracy prior to leaving the patient care area.  December 11, 2018 7:42 AM    Agnieszka Bowman PA-C  [facility]

## 2018-12-12 DIAGNOSIS — E06.3 HASHIMOTO'S THYROIDITIS: ICD-10-CM

## 2018-12-12 DIAGNOSIS — E61.1 IRON DEFICIENCY: ICD-10-CM

## 2018-12-12 DIAGNOSIS — E56.9 VITAMIN DEFICIENCY: ICD-10-CM

## 2018-12-12 LAB
DEPRECATED CALCIDIOL+CALCIFEROL SERPL-MC: 21 UG/L (ref 20–75)
FERRITIN SERPL-MCNC: 34 NG/ML (ref 12–150)
T3FREE SERPL-MCNC: 2.5 PG/ML (ref 2.3–4.2)
TSH SERPL DL<=0.005 MIU/L-ACNC: 3.94 MU/L (ref 0.4–4)

## 2018-12-12 PROCEDURE — 87338 HPYLORI STOOL AG IA: CPT | Performed by: PHYSICIAN ASSISTANT

## 2018-12-12 PROCEDURE — 36415 COLL VENOUS BLD VENIPUNCTURE: CPT | Performed by: PHYSICIAN ASSISTANT

## 2018-12-12 PROCEDURE — 84443 ASSAY THYROID STIM HORMONE: CPT | Performed by: PHYSICIAN ASSISTANT

## 2018-12-12 PROCEDURE — 84481 FREE ASSAY (FT-3): CPT | Performed by: PHYSICIAN ASSISTANT

## 2018-12-12 PROCEDURE — 82728 ASSAY OF FERRITIN: CPT | Performed by: PHYSICIAN ASSISTANT

## 2018-12-12 PROCEDURE — 82306 VITAMIN D 25 HYDROXY: CPT | Performed by: PHYSICIAN ASSISTANT

## 2018-12-13 DIAGNOSIS — E06.3 HASHIMOTO'S DISEASE: ICD-10-CM

## 2018-12-13 DIAGNOSIS — R10.13 EPIGASTRIC PAIN: ICD-10-CM

## 2018-12-13 LAB
COPATH REPORT: NORMAL
PAP: NORMAL

## 2018-12-13 RX ORDER — LEVOTHYROXINE SODIUM 25 UG/1
25 TABLET ORAL DAILY
Qty: 60 TABLET | Refills: 1 | Status: SHIPPED | OUTPATIENT
Start: 2018-12-13 | End: 2019-03-01

## 2018-12-14 LAB
FINAL DIAGNOSIS: NORMAL
H PYLORI AG STL QL IA: NORMAL
HPV HR 12 DNA CVX QL NAA+PROBE: NEGATIVE
HPV16 DNA SPEC QL NAA+PROBE: NEGATIVE
HPV18 DNA SPEC QL NAA+PROBE: NEGATIVE
SPECIMEN DESCRIPTION: NORMAL
SPECIMEN SOURCE CVX/VAG CYTO: NORMAL
SPECIMEN SOURCE: NORMAL

## 2019-03-01 DIAGNOSIS — E06.3 HASHIMOTO'S DISEASE: ICD-10-CM

## 2019-03-01 RX ORDER — LEVOTHYROXINE SODIUM 25 UG/1
25 TABLET ORAL DAILY
Qty: 60 TABLET | Refills: 0 | Status: SHIPPED | OUTPATIENT
Start: 2019-03-01 | End: 2019-04-05

## 2019-03-01 NOTE — TELEPHONE ENCOUNTER
"Requested Prescriptions   Pending Prescriptions Disp Refills     levothyroxine (SYNTHROID/LEVOTHROID) 25 MCG tablet [Pharmacy Med Name: LEVOTHYROXINE 25 MCG TABLET] 60 tablet 1    Last Written Prescription Date:  12/13/18  Last Fill Quantity: 60,  # refills: 1   Last office visit: 12/11/2018 with prescribing provider:  YES    Future Office Visit:   Next 5 appointments (look out 90 days)    Mar 11, 2019  9:00 AM CDT  Office Visit with Agnieszka Bowman PA-C  [facility] ([facility] [address] [place] [id number] [phone]          Sig: TAKE 1 TABLET (25 MCG) BY MOUTH DAILY    Thyroid Protocol Passed - 3/1/2019  1:31 AM       Passed - Patient is 12 years or older       Passed - Recent (12 mo) or future (30 days) visit within the authorizing provider's specialty    Patient had office visit in the last 12 months or has a visit in the next 30 days with authorizing provider or within the authorizing provider's specialty.  See \"Patient Info\" tab in inbasket, or \"Choose Columns\" in Meds & Orders section of the refill encounter.             Passed - Medication is active on med list       Passed - Normal TSH on file in past 12 months    Recent Labs   Lab Test 12/12/18  0736   TSH 3.94             Passed - No active pregnancy on record    If patient is pregnant or has had a positive pregnancy test, please check TSH.         Passed - No positive pregnancy test in past 12 months    If patient is pregnant or has had a positive pregnancy test, please check TSH.            "

## 2019-03-01 NOTE — TELEPHONE ENCOUNTER
Prescription approved per [facility] Refill Protocol.  LOV: 12/11/2018  Labs: up to date --notes to recheck in February patient has appointment scheduled 03/11/2019    Jacque Angelo, RN  Triage Nurse

## 2019-03-08 NOTE — PROGRESS NOTES
SUBJECTIVE:   Shayla Nolasco is a 33 year old female who presents to clinic today for the following health issues:    Patient is here to follow Up for lab results. Labs were done in December 13, 2018 for Hashimoto's Disease. Stated that she has been feeling good, no symptoms of concerns. Would like to follow up.     Reports one episode of syncope last week. Was in Yoga, first thing in the morning and she was on all fours, and ended up waking up with her face on the mat. Apparently nobody else noticed because the room was dark. She doesn't eat before class, usually drinks 2 cups of water right when she wakes up. Occasionally gets lightheaded when going from sitting to standing. Doesn't remembering feeling this that day. No other symptoms including chest pain, shortness of breath, heart palpitaions, visual changes, urinary issues etc.     Hasn't seen a therapist for body image issues yet because of cost    Reports some acne that started in June. She has decreased her exercise to no more than 50 min daily. Feels great. Denies changing her eating habits.     No other complaints or concerns        Problem list and histories reviewed & adjusted, as indicated.  Additional history: as documented    ROS:  CONSTITUTIONAL: NEGATIVE for fever, chills, change in weight  INTEGUMENTARY/SKIN: NEGATIVE for worrisome rashes, moles or lesions  EYES: NEGATIVE for vision changes or irritation  ENT/MOUTH: NEGATIVE for ear, mouth and throat problems  RESP: NEGATIVE for significant cough or SOB  BREAST: NEGATIVE for masses, tenderness or discharge  CV: NEGATIVE for chest pain, palpitations or peripheral edema  GI: NEGATIVE for nausea, abdominal pain, heartburn, or change in bowel habits  : NEGATIVE for frequency, dysuria, or hematuria  MUSCULOSKELETAL: NEGATIVE for significant arthralgias or myalgia  NEURO: NEGATIVE for behavior changes, dysphagia, memory problems, numbness or tingling , paralysis  and radicular pain   ENDOCRINE:  "NEGATIVE for temperature intolerance, skin/hair changes  HEME: NEGATIVE for bleeding problems  PSYCHIATRIC: NEGATIVE for changes in mood or affect    Patient Active Problem List   Diagnosis     QUEENIE III (cervical intraepithelial neoplasia III)     Family history of ovarian cancer     Family history of BRCA gene positive     Mixed anxiety depressive disorder     Hashimoto's disease     Gastroesophageal reflux disease without esophagitis     Past Surgical History:   Procedure Laterality Date     CL AFF SURGICAL PATHOLOGY  10/2007    LEEP     CL AFF SURGICAL PATHOLOGY  9/2007       Social History     Tobacco Use     Smoking status: Never Smoker     Smokeless tobacco: Never Used   Substance Use Topics     Alcohol use: Yes     Alcohol/week: 2.5 oz     Types: 5 Glasses of wine per week     Family History   Problem Relation Age of Onset     Diabetes Mother         IDDM (type one)     Cancer Mother 51        Ovarian Cancer (3 times)     Diabetes Maternal Grandmother         IDDM (adult onset) Type 2     Hypertension Maternal Grandmother      Prostate Cancer Paternal Grandfather      Cancer - colorectal Paternal Grandfather         age 70's (had stomach Ca as a younger man)     Neurologic Disorder Father         migraines     Gastrointestinal Disease Father         stomach ulcers     Lipids Maternal Grandfather         high cholesterol     Circulatory Maternal Grandfather         blood clots     C.A.D. Maternal Grandfather         MI     Skin Cancer No family hx of         no skin cancer           Labs reviewed in EPIC  BP Readings from Last 3 Encounters:   03/11/19 112/70   12/11/18 103/62   10/31/18 112/80    Wt Readings from Last 3 Encounters:   03/11/19 62.6 kg (137 lb 14.4 oz)   12/11/18 61.9 kg (136 lb 8 oz)   10/31/18 63 kg (139 lb)                    OBJECTIVE:                                                    /70   Pulse 53   Temp 98.4  F (36.9  C) (Oral)   Ht 1.7 m (5' 6.93\")   Wt 62.6 kg (137 lb 14.4 oz) "   LMP 02/25/2019 (Exact Date)   SpO2 100%   BMI 21.64 kg/m   Body mass index is 21.64 kg/m .   GENERAL: healthy, alert, well nourished, well hydrated, no distress  EYES: Eyes grossly normal to inspection, extraocular movements - intact, and PERRL  HENT: ear canals- normal; TMs- normal; Nose- normal; Mouth- no ulcers, no lesions  NECK: no tenderness, no adenopathy, no asymmetry, no masses, no stiffness; thyroid- normal to palpation  RESP: lungs clear to auscultation - no rales, no rhonchi, no wheezes  CV: regular rates and rhythm, normal S1 S2, no S3 or S4 and no murmur, no click or rub -  ABDOMEN: soft, no tenderness, no  hepatosplenomegaly, no masses, normal bowel sounds  MS: extremities- no gross deformities noted, no edema  SKIN: no suspicious lesions, no rashes  NEURO: strength and tone- normal, sensory exam- grossly normal, mentation- intact, speech- normal, reflexes- symmetric, normal finger to nose, normal heel to toe  PSYCH: Alert and oriented times 3; speech- coherent , normal rate and volume; able to articulate logical thoughts, able to abstract reason, no tangential thoughts, no hallucinations or delusions, affect- normal    Results for orders placed or performed in visit on 03/11/19 (from the past 24 hour(s))   CBC with platelets   Result Value Ref Range    WBC 8.4 4.0 - 11.0 10e9/L    RBC Count 4.80 3.8 - 5.2 10e12/L    Hemoglobin 14.4 11.7 - 15.7 g/dL    Hematocrit 42.9 35.0 - 47.0 %    MCV 89 78 - 100 fl    MCH 30.0 26.5 - 33.0 pg    MCHC 33.6 31.5 - 36.5 g/dL    RDW 13.3 10.0 - 15.0 %    Platelet Count 369 150 - 450 10e9/L   *UA reflex to Microscopic   Result Value Ref Range    Color Urine Yellow     Appearance Urine Clear     Glucose Urine Negative NEG^Negative mg/dL    Bilirubin Urine Negative NEG^Negative    Ketones Urine Negative NEG^Negative mg/dL    Specific Gravity Urine 1.010 1.003 - 1.035    Blood Urine Negative NEG^Negative    pH Urine 7.5 (H) 5.0 - 7.0 pH    Protein Albumin Urine  "Negative NEG^Negative mg/dL    Urobilinogen Urine 0.2 0.2 - 1.0 EU/dL    Nitrite Urine Negative NEG^Negative    Leukocyte Esterase Urine Negative NEG^Negative    Source Midstream Urine           ASSESSMENT/PLAN:                                                        ICD-10-CM    1. Hashimoto's disease E06.3 TSH with free T4 reflex   2. Syncope, unspecified syncope type R55 Comprehensive metabolic panel     CBC with platelets     Ferritin     *UA reflex to Microscopic   3. Mixed anxiety depressive disorder F41.8    4. Bradycardia R00.1      Check labs, normal exam today. Suspect because of her low heart rate, she is more susceptible to syncope if not well hydrated. She drinks a few alcoholic beverages every night, so this may have contributed to dehydration. I'll get labs to rule out other causes. Advised to let us know if this happens again. No associated heart palpitations etc. Otherwise all symptoms are well controlled. Return to clinic for any new or worsening symptoms or go to ER Urgent care in off hours      Patient Instructions   Decrease vitamin D3 to 2,000 IUs daily  Return to clinic for any new or worsening symptoms or go to ER Urgent care in off hours          Estimated body mass index is 21.64 kg/m  as calculated from the following:    Height as of this encounter: 1.7 m (5' 6.93\").    Weight as of this encounter: 62.6 kg (137 lb 14.4 oz).       Agnieszka Bowman  [facility]    "

## 2019-03-11 ENCOUNTER — OFFICE VISIT (OUTPATIENT)
Dept: FAMILY MEDICINE | Facility: CLINIC | Age: 34
End: 2019-03-11
Payer: COMMERCIAL

## 2019-03-11 VITALS
HEART RATE: 53 BPM | OXYGEN SATURATION: 100 % | HEIGHT: 67 IN | TEMPERATURE: 98.4 F | WEIGHT: 137.9 LBS | BODY MASS INDEX: 21.64 KG/M2 | DIASTOLIC BLOOD PRESSURE: 70 MMHG | SYSTOLIC BLOOD PRESSURE: 112 MMHG

## 2019-03-11 DIAGNOSIS — E06.3 HASHIMOTO'S DISEASE: Primary | ICD-10-CM

## 2019-03-11 DIAGNOSIS — F41.8 MIXED ANXIETY DEPRESSIVE DISORDER: ICD-10-CM

## 2019-03-11 DIAGNOSIS — R55 SYNCOPE, UNSPECIFIED SYNCOPE TYPE: ICD-10-CM

## 2019-03-11 DIAGNOSIS — R00.1 BRADYCARDIA: ICD-10-CM

## 2019-03-11 LAB
ALBUMIN SERPL-MCNC: 4.3 G/DL (ref 3.4–5)
ALBUMIN UR-MCNC: NEGATIVE MG/DL
ALP SERPL-CCNC: 58 U/L (ref 40–150)
ALT SERPL W P-5'-P-CCNC: 20 U/L (ref 0–50)
ANION GAP SERPL CALCULATED.3IONS-SCNC: 7 MMOL/L (ref 3–14)
APPEARANCE UR: CLEAR
AST SERPL W P-5'-P-CCNC: 18 U/L (ref 0–45)
BILIRUB SERPL-MCNC: 0.7 MG/DL (ref 0.2–1.3)
BILIRUB UR QL STRIP: NEGATIVE
BUN SERPL-MCNC: 16 MG/DL (ref 7–30)
CALCIUM SERPL-MCNC: 9.3 MG/DL (ref 8.5–10.1)
CHLORIDE SERPL-SCNC: 105 MMOL/L (ref 94–109)
CO2 SERPL-SCNC: 27 MMOL/L (ref 20–32)
COLOR UR AUTO: YELLOW
CREAT SERPL-MCNC: 0.85 MG/DL (ref 0.52–1.04)
ERYTHROCYTE [DISTWIDTH] IN BLOOD BY AUTOMATED COUNT: 13.3 % (ref 10–15)
FERRITIN SERPL-MCNC: 37 NG/ML (ref 12–150)
GFR SERPL CREATININE-BSD FRML MDRD: 88 ML/MIN/{1.73_M2}
GLUCOSE SERPL-MCNC: 91 MG/DL (ref 70–99)
GLUCOSE UR STRIP-MCNC: NEGATIVE MG/DL
HCT VFR BLD AUTO: 42.9 % (ref 35–47)
HGB BLD-MCNC: 14.4 G/DL (ref 11.7–15.7)
HGB UR QL STRIP: NEGATIVE
KETONES UR STRIP-MCNC: NEGATIVE MG/DL
LEUKOCYTE ESTERASE UR QL STRIP: NEGATIVE
MCH RBC QN AUTO: 30 PG (ref 26.5–33)
MCHC RBC AUTO-ENTMCNC: 33.6 G/DL (ref 31.5–36.5)
MCV RBC AUTO: 89 FL (ref 78–100)
NITRATE UR QL: NEGATIVE
PH UR STRIP: 7.5 PH (ref 5–7)
PLATELET # BLD AUTO: 369 10E9/L (ref 150–450)
POTASSIUM SERPL-SCNC: 4.4 MMOL/L (ref 3.4–5.3)
PROT SERPL-MCNC: 7.4 G/DL (ref 6.8–8.8)
RBC # BLD AUTO: 4.8 10E12/L (ref 3.8–5.2)
SODIUM SERPL-SCNC: 139 MMOL/L (ref 133–144)
SOURCE: ABNORMAL
SP GR UR STRIP: 1.01 (ref 1–1.03)
TSH SERPL DL<=0.005 MIU/L-ACNC: 2.47 MU/L (ref 0.4–4)
UROBILINOGEN UR STRIP-ACNC: 0.2 EU/DL (ref 0.2–1)
WBC # BLD AUTO: 8.4 10E9/L (ref 4–11)

## 2019-03-11 PROCEDURE — 99214 OFFICE O/P EST MOD 30 MIN: CPT | Performed by: PHYSICIAN ASSISTANT

## 2019-03-11 PROCEDURE — 36415 COLL VENOUS BLD VENIPUNCTURE: CPT | Performed by: PHYSICIAN ASSISTANT

## 2019-03-11 PROCEDURE — 84443 ASSAY THYROID STIM HORMONE: CPT | Performed by: PHYSICIAN ASSISTANT

## 2019-03-11 PROCEDURE — 85027 COMPLETE CBC AUTOMATED: CPT | Performed by: PHYSICIAN ASSISTANT

## 2019-03-11 PROCEDURE — 82728 ASSAY OF FERRITIN: CPT | Performed by: PHYSICIAN ASSISTANT

## 2019-03-11 PROCEDURE — 81003 URINALYSIS AUTO W/O SCOPE: CPT | Performed by: PHYSICIAN ASSISTANT

## 2019-03-11 PROCEDURE — 80053 COMPREHEN METABOLIC PANEL: CPT | Performed by: PHYSICIAN ASSISTANT

## 2019-03-11 ASSESSMENT — MIFFLIN-ST. JEOR: SCORE: 1352.01

## 2019-03-11 NOTE — PATIENT INSTRUCTIONS
Decrease vitamin D3 to 2,000 IUs daily  Return to clinic for any new or worsening symptoms or go to ER Urgent care in off hours

## 2019-04-05 DIAGNOSIS — E06.3 HASHIMOTO'S DISEASE: ICD-10-CM

## 2019-04-05 RX ORDER — LEVOTHYROXINE SODIUM 25 UG/1
25 TABLET ORAL DAILY
Qty: 90 TABLET | Refills: 3 | Status: SHIPPED | OUTPATIENT
Start: 2019-04-05 | End: 2020-04-13

## 2019-04-05 NOTE — TELEPHONE ENCOUNTER
"Requested Prescriptions   Pending Prescriptions Disp Refills     levothyroxine (SYNTHROID/LEVOTHROID) 25 MCG tablet [Pharmacy Med Name: LEVOTHYROXINE 25 MCG TABLET] 60 tablet 0     Sig: TAKE 1 TABLET (25 MCG) BY MOUTH DAILY    Thyroid Protocol Passed - 4/5/2019  7:32 AM       Passed - Patient is 12 years or older       Passed - Recent (12 mo) or future (30 days) visit within the authorizing provider's specialty    Patient had office visit in the last 12 months or has a visit in the next 30 days with authorizing provider or within the authorizing provider's specialty.  See \"Patient Info\" tab in inbasket, or \"Choose Columns\" in Meds & Orders section of the refill encounter.             Passed - Medication is active on med list       Passed - Normal TSH on file in past 12 months    Recent Labs   Lab Test 03/11/19  0950   TSH 2.47             Passed - No active pregnancy on record    If patient is pregnant or has had a positive pregnancy test, please check TSH.         Passed - No positive pregnancy test in past 12 months    If patient is pregnant or has had a positive pregnancy test, please check TSH.          Last Written Prescription Date:  03/01/2019  Last Fill Quantity: 60,  # refills: 0   Last office visit: 3/11/2019 with prescribing provider:  Colby Beltrán   Future Office Visit:      "

## 2019-04-05 NOTE — TELEPHONE ENCOUNTER
Prescription approved per [facility] [place] [facility] Refill Protocol.    Ronna Grover RN  [facility]

## 2019-11-08 ENCOUNTER — HEALTH MAINTENANCE LETTER (OUTPATIENT)
Age: 34
End: 2019-11-08

## 2019-12-13 ENCOUNTER — OFFICE VISIT (OUTPATIENT)
Dept: FAMILY MEDICINE | Facility: CLINIC | Age: 34
End: 2019-12-13
Payer: COMMERCIAL

## 2019-12-13 VITALS
TEMPERATURE: 97.5 F | HEIGHT: 66 IN | BODY MASS INDEX: 21.6 KG/M2 | OXYGEN SATURATION: 97 % | WEIGHT: 134.4 LBS | HEART RATE: 85 BPM | SYSTOLIC BLOOD PRESSURE: 112 MMHG | DIASTOLIC BLOOD PRESSURE: 72 MMHG

## 2019-12-13 DIAGNOSIS — E06.3 HASHIMOTO'S THYROIDITIS: ICD-10-CM

## 2019-12-13 DIAGNOSIS — Z00.00 ROUTINE GENERAL MEDICAL EXAMINATION AT A HEALTH CARE FACILITY: Primary | ICD-10-CM

## 2019-12-13 DIAGNOSIS — R13.10 DYSPHAGIA, UNSPECIFIED TYPE: ICD-10-CM

## 2019-12-13 LAB — TSH SERPL DL<=0.005 MIU/L-ACNC: 2.74 MU/L (ref 0.4–4)

## 2019-12-13 PROCEDURE — 99213 OFFICE O/P EST LOW 20 MIN: CPT | Mod: 25 | Performed by: PHYSICIAN ASSISTANT

## 2019-12-13 PROCEDURE — 84443 ASSAY THYROID STIM HORMONE: CPT | Performed by: PHYSICIAN ASSISTANT

## 2019-12-13 PROCEDURE — 90686 IIV4 VACC NO PRSV 0.5 ML IM: CPT | Performed by: PHYSICIAN ASSISTANT

## 2019-12-13 PROCEDURE — 99395 PREV VISIT EST AGE 18-39: CPT | Mod: 25 | Performed by: PHYSICIAN ASSISTANT

## 2019-12-13 PROCEDURE — 90471 IMMUNIZATION ADMIN: CPT | Performed by: PHYSICIAN ASSISTANT

## 2019-12-13 PROCEDURE — 36415 COLL VENOUS BLD VENIPUNCTURE: CPT | Performed by: PHYSICIAN ASSISTANT

## 2019-12-13 ASSESSMENT — MIFFLIN-ST. JEOR: SCORE: 1314.25

## 2019-12-13 NOTE — PATIENT INSTRUCTIONS
Labs today  Schedule both procedures and consult (EGD, esophogeal motility study)  Continue vitamin D  Add Happy light daily  Return to clinic for any new or worsening symptoms or go to ER Urgent care in off hours     Preventive Health Recommendations  Female Ages 26 - 39  Yearly exam:   See your health care provider every year in order to    Review health changes.     Discuss preventive care.      Review your medicines if you your doctor has prescribed any.    Until age 30: Get a Pap test every three years (more often if you have had an abnormal result).    After age 30: Talk to your doctor about whether you should have a Pap test every 3 years or have a Pap test with HPV screening every 5 years.   You do not need a Pap test if your uterus was removed (hysterectomy) and you have not had cancer.  You should be tested each year for STDs (sexually transmitted diseases), if you're at risk.   Talk to your provider about how often to have your cholesterol checked.  If you are at risk for diabetes, you should have a diabetes test (fasting glucose).  Shots: Get a flu shot each year. Get a tetanus shot every 10 years.   Nutrition:     Eat at least 5 servings of fruits and vegetables each day.    Eat whole-grain bread, whole-wheat pasta and brown rice instead of white grains and rice.    Get adequate Calcium and Vitamin D.     Lifestyle    Exercise at least 150 minutes a week (30 minutes a day, 5 days of the week). This will help you control your weight and prevent disease.    Limit alcohol to one drink per day.    No smoking.     Wear sunscreen to prevent skin cancer.    See your dentist every six months for an exam and cleaning.

## 2019-12-13 NOTE — PROGRESS NOTES
SUBJECTIVE:   CC: Shayla Nolasco is an 33 year old woman who presents for preventive health visit.     Healthy Habits:    Do you get at least three servings of calcium containing foods daily (dairy, green leafy vegetables, etc.)? yes    Amount of exercise or daily activities, outside of work: Daily for about 30-60+ minutes.     Problems taking medications regularly No    Medication side effects: No    Have you had an eye exam in the past two years? No. Unsure of when last was.     Do you see a dentist twice per year? yes    Do you have sleep apnea, excessive snoring or daytime drowsiness?no      Patient states she is having swallowing concerns. Things aren't being pushed down.   It feels like food gets stuck in her esophagus.   Sometimes she has to throw up to get the food out.   Was bulimic as a teenager  Had a barium swallow study and EGD as a teenager  She denies choking, food just gets lodged in her esophagus.   Occasional heartburn  No low thyroid symptoms    Today's PHQ-2 Score:   PHQ-2 ( 1999 [employer]) 12/13/2019 3/11/2019   Q1: Little interest or pleasure in doing things 0 0   Q2: Feeling down, depressed or hopeless 0 0   PHQ-2 Score 0 0       Abuse: Current or Past(Physical, Sexual or Emotional)- No  Do you feel safe in your environment? Yes        Social History     Tobacco Use     Smoking status: Never Smoker     Smokeless tobacco: Never Used   Substance Use Topics     Alcohol use: Yes     Alcohol/week: 4.2 standard drinks     Types: 5 Glasses of wine per week     If you drink alcohol do you typically have >3 drinks per day or >7 drinks per week? No                     Reviewed orders with patient.  Reviewed health maintenance and updated orders accordingly - Yes  Lab work is in process  Patient Active Problem List   Diagnosis     QUEENIE III (cervical intraepithelial neoplasia III)     Family history of ovarian cancer     Family history of BRCA gene positive     Mixed anxiety depressive disorder      Hashimoto's disease     Gastroesophageal reflux disease without esophagitis     Past Surgical History:   Procedure Laterality Date     CL AFF SURGICAL PATHOLOGY  10/2007    LEEP     CL AFF SURGICAL PATHOLOGY  9/2007       Social History     Tobacco Use     Smoking status: Never Smoker     Smokeless tobacco: Never Used   Substance Use Topics     Alcohol use: Yes     Alcohol/week: 4.2 standard drinks     Types: 5 Glasses of wine per week     Family History   Problem Relation Age of Onset     Diabetes Mother         IDDM (type one)     Cancer Mother 51        Ovarian Cancer (3 times)     Diabetes Maternal Grandmother         IDDM (adult onset) Type 2     Hypertension Maternal Grandmother      Prostate Cancer Paternal Grandfather      Cancer - colorectal Paternal Grandfather         age 70's (had stomach Ca as a younger man)     Neurologic Disorder Father         migraines     Gastrointestinal Disease Father         stomach ulcers     Lipids Maternal Grandfather         high cholesterol     Circulatory Maternal Grandfather         blood clots     C.A.D. Maternal Grandfather         MI     Skin Cancer No family hx of         no skin cancer         Current Outpatient Medications   Medication Sig Dispense Refill     Cholecalciferol (VITAMIN D3) 125 MCG (5000 UT) CHEW        levothyroxine (SYNTHROID/LEVOTHROID) 25 MCG tablet TAKE 1 TABLET (25 MCG) BY MOUTH DAILY 90 tablet 3     Misc Natural Products (GINSENG COMPLEX OR)        Multiple Vitamins-Minerals (MULTIVITAMIN OR)        Probiotic Product (PROBIOTIC PO)          Mammogram not appropriate for this patient based on age.    Pertinent mammograms are reviewed under the imaging tab.  History of abnormal Pap smear: NO - age 30-65 PAP every 5 years with negative HPV co-testing recommended  PAP / HPV Latest Ref Rng & Units 12/11/2018 4/1/2015 1/2/2014   PAP - NIL NIL NIL   HPV 16 DNA NEG:Negative Negative Negative -   HPV 18 DNA NEG:Negative Negative Negative -   OTHER HR HPV  "NEG:Negative Negative Negative -     Reviewed and updated as needed this visit by clinical staff  Tobacco  Meds         Reviewed and updated as needed this visit by Provider            ROS:  CONSTITUTIONAL: NEGATIVE for fever, chills, change in weight  INTEGUMENTARU/SKIN: NEGATIVE for worrisome rashes, moles or lesions  EYES: NEGATIVE for vision changes or irritation  RESP: NEGATIVE for significant cough or SOB  BREAST: NEGATIVE for masses, tenderness or discharge  CV: NEGATIVE for chest pain, palpitations or peripheral edema  GI: NEGATIVE for constipation, diarrhea, dyspepsia, heartburn or reflux, hematemesis, hematochezia, hemorrhoids, jaundice, melena, nausea, poor appetite, vomiting and weight loss  : NEGATIVE for unusual urinary or vaginal symptoms. Periods are regular.  MUSCULOSKELETAL: NEGATIVE for significant arthralgias or myalgia  NEURO: NEGATIVE for weakness, dizziness or paresthesias  PSYCHIATRIC: NEGATIVE for changes in mood or affect    OBJECTIVE:   /72   Pulse 85   Temp 97.5  F (36.4  C) (Oral)   Ht 1.665 m (5' 5.55\")   Wt 61 kg (134 lb 6.4 oz)   LMP 11/21/2019   SpO2 97%   BMI 21.99 kg/m    EXAM:  GENERAL: healthy, alert and no distress  EYES: Eyes grossly normal to inspection, PERRL and conjunctivae and sclerae normal  HENT: ear canals and TM's normal, nose and mouth without ulcers or lesions  NECK: no adenopathy, no asymmetry, masses, or scars and thyroid normal to palpation  RESP: lungs clear to auscultation - no rales, rhonchi or wheezes  BREAST: normal without masses, tenderness or nipple discharge and no palpable axillary masses or adenopathy  CV: regular rate and rhythm, normal S1 S2, no S3 or S4, no murmur, click or rub, no peripheral edema and peripheral pulses strong  ABDOMEN: soft, nontender, no hepatosplenomegaly, no masses and bowel sounds normal  MS: no gross musculoskeletal defects noted, no edema  SKIN: no suspicious lesions or rashes  NEURO: Normal strength and tone, " mentation intact and speech normal  PSYCH: mentation appears normal, affect normal/bright    Diagnostic Test Results:  Labs reviewed in Epic    ASSESSMENT/PLAN:       ICD-10-CM    1. Routine general medical examination at a health care facility Z00.00    2. Dysphagia, unspecified type R13.10 INFLUENZA VACCINE IM > 6 MONTHS VALENT IIV4 [35762]     GASTROENTEROLOGY ADULT REF CONSULT ONLY     GASTROENTEROLOGY ADULT REF PROCEDURE ONLY   3. Hashimoto's thyroiditis E06.3 TSH with free T4 reflex     Patient Instructions   Labs today  Schedule both procedures and consult (EGD, esophogeal motility study)  Continue vitamin D  Add Happy light daily  Return to clinic for any new or worsening symptoms or go to ER Urgent care in off hours     Preventive Health Recommendations  Female Ages 26 - 39  Yearly exam:   See your health care provider every year in order to    Review health changes.     Discuss preventive care.      Review your medicines if you your doctor has prescribed any.    Until age 30: Get a Pap test every three years (more often if you have had an abnormal result).    After age 30: Talk to your doctor about whether you should have a Pap test every 3 years or have a Pap test with HPV screening every 5 years.   You do not need a Pap test if your uterus was removed (hysterectomy) and you have not had cancer.  You should be tested each year for STDs (sexually transmitted diseases), if you're at risk.   Talk to your provider about how often to have your cholesterol checked.  If you are at risk for diabetes, you should have a diabetes test (fasting glucose).  Shots: Get a flu shot each year. Get a tetanus shot every 10 years.   Nutrition:     Eat at least 5 servings of fruits and vegetables each day.    Eat whole-grain bread, whole-wheat pasta and brown rice instead of white grains and rice.    Get adequate Calcium and Vitamin D.     Lifestyle    Exercise at least 150 minutes a week (30 minutes a day, 5 days of the  "week). This will help you control your weight and prevent disease.    Limit alcohol to one drink per day.    No smoking.     Wear sunscreen to prevent skin cancer.    See your dentist every six months for an exam and cleaning.        COUNSELING:   Reviewed preventive health counseling, as reflected in patient instructions    Estimated body mass index is 21.99 kg/m  as calculated from the following:    Height as of this encounter: 1.665 m (5' 5.55\").    Weight as of this encounter: 61 kg (134 lb 6.4 oz).         reports that she has never smoked. She has never used smokeless tobacco.      Counseling Resources:  ATP IV Guidelines  Pooled Cohorts Equation Calculator  Breast Cancer Risk Calculator  FRAX Risk Assessment  ICSI Preventive Guidelines  Dietary Guidelines for Americans, 2010  USDA's MyPlate  ASA Prophylaxis  Lung CA Screening    Agnieszka Bowman PA-C  [facility]  "

## 2019-12-19 ENCOUNTER — TELEPHONE (OUTPATIENT)
Dept: FAMILY MEDICINE | Facility: CLINIC | Age: 34
End: 2019-12-19

## 2019-12-19 NOTE — TELEPHONE ENCOUNTER
PT stated that she was supposed to have a referral placed for GI, but when she called them they hadn't received anything. Referrals are in pt's chart, but when she spoke with GI they asked if the referral could be called in instead as faxing them over could take a long time. The number for [facility] is [phone]

## 2019-12-23 NOTE — TELEPHONE ENCOUNTER
Pt call back to see if we can just call GI about the referral. GI number [phone]    RD don't have a referral pool

## 2019-12-23 NOTE — TELEPHONE ENCOUNTER
Called [facility]. Provided the information needed so patient can get scheduled. Krystle Higuera RN on 12/23/2019 at 4:45 PM

## 2020-01-02 ENCOUNTER — TELEPHONE (OUTPATIENT)
Dept: FAMILY MEDICINE | Facility: CLINIC | Age: 35
End: 2020-01-02

## 2020-01-02 ENCOUNTER — OFFICE VISIT (OUTPATIENT)
Dept: FAMILY MEDICINE | Facility: CLINIC | Age: 35
End: 2020-01-02
Payer: COMMERCIAL

## 2020-01-02 VITALS
SYSTOLIC BLOOD PRESSURE: 118 MMHG | WEIGHT: 137.5 LBS | TEMPERATURE: 98.7 F | HEIGHT: 67 IN | BODY MASS INDEX: 21.58 KG/M2 | DIASTOLIC BLOOD PRESSURE: 78 MMHG | HEART RATE: 60 BPM | OXYGEN SATURATION: 96 %

## 2020-01-02 DIAGNOSIS — J06.9 URI WITH COUGH AND CONGESTION: ICD-10-CM

## 2020-01-02 DIAGNOSIS — J02.9 SORE THROAT: ICD-10-CM

## 2020-01-02 DIAGNOSIS — J06.9 URI WITH COUGH AND CONGESTION: Primary | ICD-10-CM

## 2020-01-02 LAB
DEPRECATED S PYO AG THROAT QL EIA: NORMAL
SPECIMEN SOURCE: NORMAL

## 2020-01-02 PROCEDURE — 87880 STREP A ASSAY W/OPTIC: CPT | Performed by: PHYSICIAN ASSISTANT

## 2020-01-02 PROCEDURE — 87081 CULTURE SCREEN ONLY: CPT | Performed by: PHYSICIAN ASSISTANT

## 2020-01-02 PROCEDURE — 99213 OFFICE O/P EST LOW 20 MIN: CPT | Performed by: PHYSICIAN ASSISTANT

## 2020-01-02 RX ORDER — CODEINE PHOSPHATE AND GUAIFENESIN 10; 100 MG/5ML; MG/5ML
1-2 SOLUTION ORAL EVERY 6 HOURS PRN
Qty: 240 ML | Refills: 0 | Status: SHIPPED | OUTPATIENT
Start: 2020-01-02 | End: 2020-01-02

## 2020-01-02 RX ORDER — CODEINE PHOSPHATE AND GUAIFENESIN 10; 100 MG/5ML; MG/5ML
1-2 SOLUTION ORAL EVERY 6 HOURS PRN
Qty: 240 ML | Refills: 0 | Status: SHIPPED | OUTPATIENT
Start: 2020-01-02 | End: 2022-11-30

## 2020-01-02 ASSESSMENT — MIFFLIN-ST. JEOR: SCORE: 1338.94

## 2020-01-02 NOTE — PROGRESS NOTES
"Shayla Nolasco is a 34 year old female who presents to clinic today for the following health issues:    HPI   Acute Illness   Acute illness concerns: Cough, fever  Onset: 1 week     Fever: no    Chills/Sweats: YES    Headache (location?): no, but more pressure     Sinus Pressure:YES    Conjunctivitis:  no    Ear Pain: YES- pressure     Rhinorrhea: yes    Congestion: YES    Sore Throat: YES     Cough: YES    Wheeze: no    Decreased Appetite: no    Nausea: yes    Vomiting: no    Diarrhea:  no    Dysuria/Freq.: no    Fatigue/Achiness: YES    Sick/Strep Exposure: no     Therapies Tried and outcome: nyquil and dayquil, tea     -Her symptoms began with ear pain, developed into sinus pressure and hoarse voice  -Patient is mainly concerned that she hasn't been improving, typically \"doesn't get sick like this\"  -Her symptoms have been causing her to lose significant sleep  -Rates her sore throat as a 5/10, one side feels swollen and is tender    Problem list and histories reviewed & adjusted, as indicated.  Additional history: as documented    ROS:  CONSTITUTIONAL: NEGATIVE for fever, change in weight, POSITIVE chills  INTEGUMENTARY/SKIN: NEGATIVE for worrisome rashes, moles or lesions  EYES: NEGATIVE for vision changes or irritation  ENT/MOUTH: NEGATIVE for mouth problems, POSITIVE sore throat, ear problems  RESP: NEGATIVE for SOB, POSITIVE cough  BREAST: NEGATIVE for masses, tenderness or discharge  CV: NEGATIVE for chest pain, palpitations or peripheral edema  GI: NEGATIVE for abdominal pain, heartburn, or change in bowel habits, POSITIVE nausea  : NEGATIVE for frequency, dysuria, or hematuria  MUSCULOSKELETAL: NEGATIVE for significant arthralgias or myalgia  NEURO: NEGATIVE for weakness, dizziness or paresthesias  ENDOCRINE: NEGATIVE for temperature intolerance, skin/hair changes  HEME: NEGATIVE for bleeding problems  PSYCHIATRIC: NEGATIVE for changes in mood or affect    This document serves as a record of the " services and decisions personally performed and made by Agnieszka Bowman PA-C. It was created on her behalf by Kerwin Coffey, trained medical scribe. The creation of this document is based on the provider's statements to the medical scribe.  Kerwin Coffey 7:55 AM January 2, 2020    Patient Active Problem List   Diagnosis     QUEENIE III (cervical intraepithelial neoplasia III)     Family history of ovarian cancer     Family history of BRCA gene positive     Mixed anxiety depressive disorder     Hashimoto's disease     Gastroesophageal reflux disease without esophagitis     Past Surgical History:   Procedure Laterality Date     CL AFF SURGICAL PATHOLOGY  10/2007    LEEP     CL AFF SURGICAL PATHOLOGY  9/2007       Social History     Tobacco Use     Smoking status: Never Smoker     Smokeless tobacco: Never Used   Substance Use Topics     Alcohol use: Yes     Alcohol/week: 4.2 standard drinks     Types: 5 Glasses of wine per week     Family History   Problem Relation Age of Onset     Diabetes Mother         IDDM (type one)     Cancer Mother 51        Ovarian Cancer (3 times)     Diabetes Maternal Grandmother         IDDM (adult onset) Type 2     Hypertension Maternal Grandmother      Prostate Cancer Paternal Grandfather      Cancer - colorectal Paternal Grandfather         age 70's (had stomach Ca as a younger man)     Neurologic Disorder Father         migraines     Gastrointestinal Disease Father         stomach ulcers     Lipids Maternal Grandfather         high cholesterol     Circulatory Maternal Grandfather         blood clots     C.A.D. Maternal Grandfather         MI     Skin Cancer No family hx of         no skin cancer           Labs reviewed in EPIC  Patient Active Problem List   Diagnosis     QUEENIE III (cervical intraepithelial neoplasia III)     Family history of ovarian cancer     Family history of BRCA gene positive     Mixed anxiety depressive disorder     Hashimoto's disease     Gastroesophageal reflux  "disease without esophagitis     Past Surgical History:   Procedure Laterality Date     CL AFF SURGICAL PATHOLOGY  10/2007    LEEP     CL AFF SURGICAL PATHOLOGY  9/2007       Social History     Tobacco Use     Smoking status: Never Smoker     Smokeless tobacco: Never Used   Substance Use Topics     Alcohol use: Yes     Alcohol/week: 4.2 standard drinks     Types: 5 Glasses of wine per week     Family History   Problem Relation Age of Onset     Diabetes Mother         IDDM (type one)     Cancer Mother 51        Ovarian Cancer (3 times)     Diabetes Maternal Grandmother         IDDM (adult onset) Type 2     Hypertension Maternal Grandmother      Prostate Cancer Paternal Grandfather      Cancer - colorectal Paternal Grandfather         age 70's (had stomach Ca as a younger man)     Neurologic Disorder Father         migraines     Gastrointestinal Disease Father         stomach ulcers     Lipids Maternal Grandfather         high cholesterol     Circulatory Maternal Grandfather         blood clots     C.A.D. Maternal Grandfather         MI     Skin Cancer No family hx of         no skin cancer         Current Outpatient Medications   Medication Sig Dispense Refill     Cholecalciferol (VITAMIN D3) 125 MCG (5000 UT) CHEW        guaiFENesin-codeine (ROBITUSSIN AC) 100-10 MG/5ML solution Take 5-10 mLs by mouth every 6 hours as needed for cough 240 mL 0     levothyroxine (SYNTHROID/LEVOTHROID) 25 MCG tablet TAKE 1 TABLET (25 MCG) BY MOUTH DAILY 90 tablet 3     Misc Natural Products (GINSENG COMPLEX OR)        Multiple Vitamins-Minerals (MULTIVITAMIN OR)        Probiotic Product (PROBIOTIC PO)          OBJECTIVE:                                                    /78   Pulse 60   Temp 98.7  F (37.1  C) (Oral)   Ht 1.69 m (5' 6.54\")   Wt 62.4 kg (137 lb 8 oz)   SpO2 96%   BMI 21.84 kg/m   Body mass index is 21.84 kg/m .   GENERAL:  Alert and oriented x 3. No acute distress. WD WN  HEAD:  " Normocephalic.Atramautic  EYES:  PERRLA.  EOMs are full.  Sclerae are clear.  Fundi not visualized. No discharge  EARS:  Normal canal without edema exudates or discharge. TM normal. No bulging or retraction  NOSE:  Clear rhinorrhea  MOUTH/THROAT:  Oral hygene is good. Moist mucus membranes. No oral or pharyngeal lesions are seen. Oropharynx without exudates edema. With mild erythema.  NECK:  Supple.  No lymphadenopathy. ROM intact without nuchal rigidity.  LUNGS:  Clear to auscultation bilaterally without rhonchi rhales or wheezes. Chest rise symmetrical and no tenderness to palpation. Good breath sounds throughout.  HEART:  Regular rhythm.  Normal rate.  No murmur, gallop, rub or  ectopy.  PMI is not displaced.  EXTREMITIES:  Warm, well perfused with good ROM and strength. No cyanosis or edema.    SKIN:  Warm and dry.  No rashes  NEUROLOGIC:  CN 2-12 grossly intact.  No focal neurological deficits.      Results for orders placed or performed in visit on 01/02/20 (from the past 24 hour(s))   Strep, Rapid Screen   Result Value Ref Range    Specimen Description Throat     Rapid Strep A Screen       NEGATIVE: No Group A streptococcal antigen detected by immunoassay, await culture report.          ASSESSMENT/PLAN:                                                        ICD-10-CM    1. URI with cough and congestion J06.9 guaiFENesin-codeine (ROBITUSSIN AC) 100-10 MG/5ML solution   2. Sore throat J02.9 Strep, Rapid Screen     Beta strep group A culture       Patient Instructions       Patient Education     Viral Upper Respiratory Illness (Adult)    You have a viral upper respiratory illness (URI), which is another term for the common cold. This illness is contagious during the first few days. It is spread through the air by coughing and sneezing. It may also be spread by direct contact (touching the sick person and then touching your own eyes, nose, or mouth). Frequent handwashing will decrease risk of spread. Most viral  illnesses go away within 7 to 10 days with rest and simple home remedies. Sometimes the illness may last for several weeks. Antibiotics will not kill a virus, and they are generally not prescribed for this condition.  Home care    If symptoms are severe, rest at home for the first 2 to 3 days. When you resume activity, don't let yourself get too tired.    Don't smoke. If you need help stopping, talk with your healthcare provider.    Avoid being exposed to cigarette smoke (yours or others ).    You may use acetaminophen or ibuprofen to control pain and fever, unless another medicine was prescribed. If you have chronic liver or kidney disease, have ever had a stomach ulcer or gastrointestinal bleeding, or are taking blood-thinning medicines, talk with your healthcare provider before using these medicines. Aspirin should never be given to anyone under 18 years of age who is ill with a viral infection or fever. It may cause severe liver or brain damage.    Your appetite may be poor, so a light diet is fine. Stay well hydrated by drinking 6 to 8 glasses of fluids per day (water, soft drinks, juices, tea, or soup). Extra fluids will help loosen secretions in the nose and lungs.    Over-the-counter cold medicines will not shorten the length of time you re sick, but they may be helpful for the following symptoms: cough, sore throat, and nasal and sinus congestion. If you take prescription medicines, ask your healthcare provider or pharmacist which over-the-counter medicines are safe to use. (Note: Don't use decongestants if you have high blood pressure.)  Follow-up care  Follow up with your healthcare provider, or as advised.  When to seek medical advice  Call your healthcare provider right away if any of these occur:    Cough with lots of colored sputum (mucus)    Severe headache; face, neck, or ear pain    Difficulty swallowing due to throat pain    Fever of 100.4 F (38 C) or higher, or as directed by your healthcare  "provider  Call 911  Call 911 if any of these occur:    Chest pain, shortness of breath, wheezing, or difficulty breathing    Coughing up blood    Very severe pain with swallowing, especially if it goes along with a muffled voice   Date Last Reviewed: 6/1/2018 2000-2018 [employer]. [address] [place] [address]. All rights reserved. This information is not intended as a substitute for professional medical care. Always follow your healthcare professional's instructions.                  Estimated body mass index is 21.84 kg/m  as calculated from the following:    Height as of this encounter: 1.69 m (5' 6.54\").    Weight as of this encounter: 62.4 kg (137 lb 8 oz).   Weight management plan: See PCP    The information in this document, created by the medical scribe for me, accurately reflects the services I personally performed and the decisions made by me. I have reviewed and approved this document for accuracy prior to leaving the patient care area.  January 2, 2020 7:55 AM    Agnieszka Bowman  [facility] [place]      "

## 2020-01-02 NOTE — PATIENT INSTRUCTIONS

## 2020-01-02 NOTE — TELEPHONE ENCOUNTER
Patient is at pharmacy waiting for prescription. I see it was a local print instead of e=prescirbed. Did you or the TC fax over the medication? Krystle Higuera RN on 1/2/2020 at 3:13 PM

## 2020-01-03 LAB
BACTERIA SPEC CULT: NORMAL
SPECIMEN SOURCE: NORMAL

## 2020-04-12 DIAGNOSIS — E06.3 HASHIMOTO'S DISEASE: ICD-10-CM

## 2020-04-13 RX ORDER — LEVOTHYROXINE SODIUM 25 UG/1
25 TABLET ORAL DAILY
Qty: 90 TABLET | Refills: 1 | Status: SHIPPED | OUTPATIENT
Start: 2020-04-13 | End: 2020-10-14

## 2020-04-13 NOTE — TELEPHONE ENCOUNTER
"Requested Prescriptions   Pending Prescriptions Disp Refills     levothyroxine (SYNTHROID/LEVOTHROID) 25 MCG tablet [Pharmacy Med Name: LEVOTHYROXINE 25 MCG TABLET] 30 tablet 2     Sig: TAKE 1 TABLET (25 MCG) BY MOUTH DAILY   Last Written Prescription Date:  4/5/19  Last Fill Quantity: 90,  # refills: 3   Last office visit: 1/2/2020 with prescribing provider:     Future Office Visit:        Thyroid Protocol Passed - 4/13/2020  7:17 AM        Passed - Patient is 12 years or older        Passed - Recent (12 mo) or future (30 days) visit within the authorizing provider's specialty     Patient has had an office visit with the authorizing provider or a provider within the authorizing providers department within the previous 12 mos or has a future within next 30 days. See \"Patient Info\" tab in inbasket, or \"Choose Columns\" in Meds & Orders section of the refill encounter.              Passed - Medication is active on med list        Passed - Normal TSH on file in past 12 months     Recent Labs   Lab Test 12/13/19  1333   TSH 2.74              Passed - No active pregnancy on record     If patient is pregnant or has had a positive pregnancy test, please check TSH.          Passed - No positive pregnancy test in past 12 months     If patient is pregnant or has had a positive pregnancy test, please check TSH.             "

## 2020-04-13 NOTE — TELEPHONE ENCOUNTER
Prescription approved per [facility] Refill Protocol.    Caroline Pineda RN   [facility]

## 2020-10-14 DIAGNOSIS — E06.3 HASHIMOTO'S DISEASE: ICD-10-CM

## 2020-10-14 RX ORDER — LEVOTHYROXINE SODIUM 25 UG/1
TABLET ORAL
Qty: 90 TABLET | Refills: 0 | Status: SHIPPED | OUTPATIENT
Start: 2020-10-14 | End: 2021-01-18

## 2020-10-14 NOTE — TELEPHONE ENCOUNTER
Prescription approved per [facility] Refill Protocol.    Caroline Pineda RN   [facility]

## 2020-12-06 ENCOUNTER — HEALTH MAINTENANCE LETTER (OUTPATIENT)
Age: 35
End: 2020-12-06

## 2021-01-15 ENCOUNTER — HEALTH MAINTENANCE LETTER (OUTPATIENT)
Age: 36
End: 2021-01-15

## 2021-09-25 ENCOUNTER — HEALTH MAINTENANCE LETTER (OUTPATIENT)
Age: 36
End: 2021-09-25

## 2022-03-12 ENCOUNTER — HEALTH MAINTENANCE LETTER (OUTPATIENT)
Age: 37
End: 2022-03-12

## 2022-07-14 DIAGNOSIS — E06.3 HASHIMOTO'S DISEASE: ICD-10-CM

## 2022-07-18 RX ORDER — LEVOTHYROXINE SODIUM 25 UG/1
25 TABLET ORAL DAILY
Qty: 90 TABLET | Refills: 0 | Status: SHIPPED | OUTPATIENT
Start: 2022-07-18 | End: 2022-08-19

## 2022-08-10 ASSESSMENT — ENCOUNTER SYMPTOMS
NAUSEA: 0
CHILLS: 0
SORE THROAT: 0
COUGH: 0
DYSURIA: 0
FREQUENCY: 0
ARTHRALGIAS: 0
EYE PAIN: 0
DIARRHEA: 0
WEAKNESS: 0
ABDOMINAL PAIN: 0
CONSTIPATION: 0
SHORTNESS OF BREATH: 0
HEMATURIA: 0
PARESTHESIAS: 0
PALPITATIONS: 0
HEARTBURN: 0
HEADACHES: 0
JOINT SWELLING: 0
BREAST MASS: 0
DIZZINESS: 0
HEMATOCHEZIA: 0
FEVER: 0
NERVOUS/ANXIOUS: 1
MYALGIAS: 0

## 2022-08-17 ENCOUNTER — OFFICE VISIT (OUTPATIENT)
Dept: FAMILY MEDICINE | Facility: CLINIC | Age: 37
End: 2022-08-17
Payer: COMMERCIAL

## 2022-08-17 ENCOUNTER — LAB (OUTPATIENT)
Dept: LAB | Facility: CLINIC | Age: 37
End: 2022-08-17

## 2022-08-17 VITALS
DIASTOLIC BLOOD PRESSURE: 88 MMHG | SYSTOLIC BLOOD PRESSURE: 126 MMHG | WEIGHT: 135 LBS | BODY MASS INDEX: 21.69 KG/M2 | RESPIRATION RATE: 14 BRPM | OXYGEN SATURATION: 97 % | HEART RATE: 52 BPM | TEMPERATURE: 98.7 F | HEIGHT: 66 IN

## 2022-08-17 DIAGNOSIS — E56.9 VITAMIN DEFICIENCY: ICD-10-CM

## 2022-08-17 DIAGNOSIS — E06.3 HASHIMOTO'S DISEASE: ICD-10-CM

## 2022-08-17 DIAGNOSIS — Z00.00 ROUTINE GENERAL MEDICAL EXAMINATION AT A HEALTH CARE FACILITY: Primary | ICD-10-CM

## 2022-08-17 DIAGNOSIS — K22.89 ESOPHAGEAL PAIN: ICD-10-CM

## 2022-08-17 LAB
T4 FREE SERPL-MCNC: 0.86 NG/DL (ref 0.76–1.46)
TSH SERPL DL<=0.005 MIU/L-ACNC: 4.13 MU/L (ref 0.4–4)

## 2022-08-17 PROCEDURE — 84439 ASSAY OF FREE THYROXINE: CPT

## 2022-08-17 PROCEDURE — 99395 PREV VISIT EST AGE 18-39: CPT | Performed by: NURSE PRACTITIONER

## 2022-08-17 PROCEDURE — 36415 COLL VENOUS BLD VENIPUNCTURE: CPT

## 2022-08-17 PROCEDURE — 99213 OFFICE O/P EST LOW 20 MIN: CPT | Mod: 25 | Performed by: NURSE PRACTITIONER

## 2022-08-17 PROCEDURE — 82306 VITAMIN D 25 HYDROXY: CPT

## 2022-08-17 PROCEDURE — 84443 ASSAY THYROID STIM HORMONE: CPT

## 2022-08-17 RX ORDER — CHLORAL HYDRATE 500 MG
2 CAPSULE ORAL DAILY
COMMUNITY

## 2022-08-17 ASSESSMENT — ENCOUNTER SYMPTOMS
NERVOUS/ANXIOUS: 1
DIARRHEA: 0
HEMATURIA: 0
SHORTNESS OF BREATH: 0
COUGH: 0
HEADACHES: 0
CONSTIPATION: 0
FEVER: 0
ABDOMINAL PAIN: 0
EYE PAIN: 0
NAUSEA: 0
ARTHRALGIAS: 0
DYSURIA: 0
WEAKNESS: 0
JOINT SWELLING: 0
HEMATOCHEZIA: 0
PARESTHESIAS: 0
FREQUENCY: 0
PALPITATIONS: 0
MYALGIAS: 0
CHILLS: 0
HEARTBURN: 0
DIZZINESS: 0
BREAST MASS: 0
SORE THROAT: 0

## 2022-08-17 ASSESSMENT — PAIN SCALES - GENERAL: PAINLEVEL: NO PAIN (0)

## 2022-08-17 NOTE — PROGRESS NOTES
SUBJECTIVE:   CC: Shayla Nolasco is an 36 year old woman who presents for preventive health visit.       Patient has been advised of split billing requirements and indicates understanding: Yes  Healthy Habits:     Getting at least 3 servings of Calcium per day:  Yes    Bi-annual eye exam:  NO    Dental care twice a year:  Yes    Sleep apnea or symptoms of sleep apnea:  None    Diet:  Regular (no restrictions)    Frequency of exercise:  6-7 days/week    Duration of exercise:  30-45 minutes    Taking medications regularly:  Yes    Medication side effects:  None    PHQ-2 Total Score: 0    Additional concerns today:  Yes    [place]oved back to [place] after [place] and [place]  Food gets stuck in the bottom of her esophagus and was going to do UGI study Paty ordered and she never did  Maybe getting a bit worse but no known cause or new symptoms  Very healthy lifestyle  Thyroid, and periods heavy, was told to consider and IUD she wants to discuss more about today       Hypothyroidism Follow-up      Since last visit, patient describes the following symptoms: Weight stable, no hair loss, no skin changes, no constipation, no loose stools      Today's PHQ-2 Score:   PHQ-2 ( 1999 Pfizer) 8/10/2022   Q1: Little interest or pleasure in doing things 0   Q2: Feeling down, depressed or hopeless 0   PHQ-2 Score 0   PHQ-2 Total Score (12-17 Years)- Positive if 3 or more points; Administer PHQ-A if positive -   Q1: Little interest or pleasure in doing things Not at all   Q2: Feeling down, depressed or hopeless Not at all   PHQ-2 Score 0       Abuse: Current or Past (Physical, Sexual or Emotional) - No  Do you feel safe in your environment? Yes    Have you ever done Advance Care Planning? (For example, a Health Directive, POLST, or a discussion with a medical provider or your loved ones about your wishes): No, advance care planning information given to patient to review.  Patient plans to discuss their wishes with loved ones or provider.      Social  History     Tobacco Use     Smoking status: Never Smoker     Smokeless tobacco: Never Used   Substance Use Topics     Alcohol use: Yes     Alcohol/week: 4.2 standard drinks     Types: 5 Glasses of wine per week     If you drink alcohol do you typically have >3 drinks per day or >7 drinks per week? No    Alcohol Use 8/10/2022   Prescreen: >3 drinks/day or >7 drinks/week? No       Reviewed orders with patient.  Reviewed health maintenance and updated orders accordingly - Yes  Lab work is in process  Labs reviewed in EPIC  BP Readings from Last 3 Encounters:   08/17/22 126/88   01/02/20 118/78   12/13/19 112/72    Wt Readings from Last 3 Encounters:   08/17/22 61.2 kg (135 lb)   01/02/20 62.4 kg (137 lb 8 oz)   12/13/19 61 kg (134 lb 6.4 oz)                  Patient Active Problem List   Diagnosis     QUEENIE III (cervical intraepithelial neoplasia III)     Family history of ovarian cancer     Family history of BRCA gene positive     Mixed anxiety depressive disorder     Hashimoto's disease     Gastroesophageal reflux disease without esophagitis     Past Surgical History:   Procedure Laterality Date     CL AFF SURGICAL PATHOLOGY  10/2007    LEEP     CL AFF SURGICAL PATHOLOGY  9/2007       Social History     Tobacco Use     Smoking status: Never Smoker     Smokeless tobacco: Never Used   Substance Use Topics     Alcohol use: Yes     Alcohol/week: 6.0 standard drinks     Types: 6 Glasses of wine per week     Family History   Problem Relation Age of Onset     Diabetes Mother         IDDM (type one)     Cancer Mother 51        Ovarian Cancer (3 times)     Diabetes Maternal Grandmother         IDDM (adult onset) Type 2     Hypertension Maternal Grandmother      Prostate Cancer Paternal Grandfather      Cancer - colorectal Paternal Grandfather         age 70's (had stomach Ca as a younger man)     Neurologic Disorder Father         migraines     Gastrointestinal Disease Father         stomach ulcers     Lipids Maternal  Grandfather         high cholesterol     Circulatory Maternal Grandfather         blood clots     C.A.D. Maternal Grandfather         MI     Skin Cancer No family hx of         no skin cancer         Current Outpatient Medications   Medication Sig Dispense Refill     Cholecalciferol (VITAMIN D3) 125 MCG (5000 UT) CHEW        fish oil-omega-3 fatty acids 1000 MG capsule Take 2 g by mouth daily       levothyroxine (SYNTHROID/LEVOTHROID) 25 MCG tablet Take 1 tablet (25 mcg) by mouth daily 90 tablet 0     Multiple Vitamins-Minerals (MULTIVITAMIN OR)        guaiFENesin-codeine (ROBITUSSIN AC) 100-10 MG/5ML solution Take 5-10 mLs by mouth every 6 hours as needed for cough (Patient not taking: Reported on 8/17/2022) 240 mL 0     Misc Natural Products (GINSENG COMPLEX OR)  (Patient not taking: Reported on 8/17/2022)       Probiotic Product (PROBIOTIC PO)  (Patient not taking: Reported on 8/17/2022)       Allergies   Allergen Reactions     Sulfa Drugs Hives     Formaldehyde Rash     Lanolin Rash     Other [No Clinical Screening - See Comments] Rash     Carba mix       Breast Cancer Screening:    FHS-7:   Breast CA Risk Assessment (FHS-7) 8/10/2022   Did any of your first-degree relatives have breast or ovarian cancer? Yes   Did any of your relatives have bilateral breast cancer? No   Did any man in your family have breast cancer? No   Did any woman in your family have breast and ovarian cancer? Yes   Did any woman in your family have breast cancer before age 50 y? No   Do you have 2 or more relatives with breast and/or ovarian cancer? No   Do you have 2 or more relatives with breast and/or bowel cancer? No       Patient under 40 years of age: Routine Mammogram Screening not recommended.   Pertinent mammograms are reviewed under the imaging tab.    History of abnormal Pap smear: NO - age 30-65 PAP every 5 years with negative HPV co-testing recommended  PAP / HPV Latest Ref Rng & Units 12/11/2018 4/1/2015 1/2/2014   PAP  (Historical) - NIL NIL NIL   HPV16 NEG:Negative Negative Negative -   HPV18 NEG:Negative Negative Negative -   HRHPV NEG:Negative Negative Negative -     Reviewed and updated as needed this visit by clinical staff                    Reviewed and updated as needed this visit by Provider                   Past Medical History:   Diagnosis Date     ASCUS favoring dysplasia 2011    colp - QUEENIE I     QUEENIE III (cervical intraepithelial neoplasia III) 10/07    Had a LEEP then at at first visit with us has had one NIL pap in ; LSIL PAP in  and colp with Negative biopses, first diagnostic PAP :  NIL      Past Surgical History:   Procedure Laterality Date     CL AFF SURGICAL PATHOLOGY  10/2007    LEEP     CL AFF SURGICAL PATHOLOGY  2007     OB History    Para Term  AB Living   0 0 0 0 0 0   SAB IAB Ectopic Multiple Live Births   0 0 0 0 0       Review of Systems   Constitutional: Negative for chills and fever.   HENT: Negative for congestion, ear pain, hearing loss and sore throat.    Eyes: Negative for pain and visual disturbance.   Respiratory: Negative for cough and shortness of breath.    Cardiovascular: Negative for chest pain, palpitations and peripheral edema.   Gastrointestinal: Negative for abdominal pain, constipation, diarrhea, heartburn, hematochezia and nausea.   Breasts:  Negative for tenderness, breast mass and discharge.   Genitourinary: Negative for dysuria, frequency, genital sores, hematuria, pelvic pain, urgency, vaginal bleeding and vaginal discharge.   Musculoskeletal: Negative for arthralgias, joint swelling and myalgias.   Skin: Negative for rash.   Neurological: Negative for dizziness, weakness, headaches and paresthesias.   Psychiatric/Behavioral: Negative for mood changes. The patient is nervous/anxious.      Constitutional, eye, ENT, skin, breast, respiratory, cardiac, GI, , MSK, neuro, psych, and allergy are normal except as otherwise noted.       OBJECTIVE:   BP  "126/88   Pulse 52   Temp 98.7  F (37.1  C)   Resp 14   Ht 1.676 m (5' 5.98\")   Wt 61.2 kg (135 lb)   LMP 08/04/2022   SpO2 97%   BMI 21.80 kg/m    Physical Exam  GENERAL: healthy, alert and no distress  EYES: Eyes grossly normal to inspection, PERRL and conjunctivae and sclerae normal  HENT: ear canals and TM's normal, nose and mouth without ulcers or lesions  NECK: no adenopathy, no asymmetry, masses, or scars and thyroid normal to palpation  RESP: lungs clear to auscultation - no rales, rhonchi or wheezes  BREAST: normal without masses, tenderness or nipple discharge and no palpable axillary masses or adenopathy  CV: regular rate and rhythm, normal S1 S2, no S3 or S4, no murmur, click or rub, no peripheral edema and peripheral pulses strong  ABDOMEN: soft, nontender, no hepatosplenomegaly, no masses and bowel sounds normal  MS: no gross musculoskeletal defects noted, no edema  SKIN: no suspicious lesions or rashes  NEURO: Normal strength and tone, mentation intact and speech normal  PSYCH: mentation appears normal, affect normal/bright    Diagnostic Test Results:  Labs reviewed in Epic    ASSESSMENT/PLAN:       ICD-10-CM    1. Routine general medical examination at a health care facility  Z00.00    2. Hashimoto's disease  E06.3 TSH with free T4 reflex     OFFICE/OUTPT VISIT,EST,LEVL III   3. Esophageal pain  K22.89 UPPER GI ENDOSCOPY     OFFICE/OUTPT VISIT,EST,LEVL III   4. Vitamin deficiency  E56.9 Vitamin D Deficiency   swallow study vs UGI, establish with a Primary Care Provider and follow up if not improving, should rule out malignancy although discussed not likely  Schedule for IUD mirena if desired to regulate menses  Refill med and yearly check if stable       Patient has been advised of split billing requirements and indicates understanding: Yes    COUNSELING:  Reviewed preventive health counseling, as reflected in patient instructions    Estimated body mass index is 21.84 kg/m  as calculated from " "the following:    Height as of 1/2/20: 1.69 m (5' 6.54\").    Weight as of 1/2/20: 62.4 kg (137 lb 8 oz).        She reports that she has never smoked. She has never used smokeless tobacco.      Counseling Resources:  ATP IV Guidelines  Pooled Cohorts Equation Calculator  Breast Cancer Risk Calculator  BRCA-Related Cancer Risk Assessment: FHS-7 Tool  FRAX Risk Assessment  ICSI Preventive Guidelines  Dietary Guidelines for Americans, 2010  USDA's MyPlate  ASA Prophylaxis  Lung CA Screening    Jaye Rubin, REENA [facility]  "

## 2022-08-18 LAB — DEPRECATED CALCIDIOL+CALCIFEROL SERPL-MC: 57 UG/L (ref 20–75)

## 2022-08-19 DIAGNOSIS — E06.3 HASHIMOTO'S DISEASE: ICD-10-CM

## 2022-08-19 RX ORDER — LEVOTHYROXINE SODIUM 25 UG/1
25 TABLET ORAL DAILY
Qty: 90 TABLET | Refills: 0 | Status: SHIPPED | OUTPATIENT
Start: 2022-08-19 | End: 2022-12-01

## 2022-08-19 RX ORDER — LEVOTHYROXINE SODIUM 50 UG/1
50 TABLET ORAL DAILY
Qty: 90 TABLET | Refills: 1 | Status: CANCELLED | OUTPATIENT
Start: 2022-08-19

## 2022-08-19 NOTE — TELEPHONE ENCOUNTER
Would like to stay at current dose which is 25 mcg per day. Is that ok?  Will try diet and lifestyle changes too. Feeling ok currently. Will get rechecked at beginning of 2023.    Claribel GAO RN

## 2022-08-19 NOTE — TELEPHONE ENCOUNTER
Shayla,      Here are your recent results. Your Vitamin D level looks great. The thyroid level looks like we may need to increase the dose you take slightly. Are you okay with me sending a prescription for 50 mcg you can take daily to the pharmacy of your choice?      The other option is taking 1.5 tab of what you currently take, either dose is reasonable since the T4 is still in normal range, and we can recheck the lab work again in 3-6 months either way.      Regards,   REENA Friedman CNP    LMOM to call back with questions/pharmacy.   Claribel GAO RN

## 2022-08-31 ENCOUNTER — TELEPHONE (OUTPATIENT)
Dept: FAMILY MEDICINE | Facility: CLINIC | Age: 37
End: 2022-08-31

## 2022-08-31 DIAGNOSIS — K22.89 ESOPHAGEAL PAIN: Primary | ICD-10-CM

## 2022-08-31 NOTE — TELEPHONE ENCOUNTER
Patient calling in as she has not heard from GI to schedule endoscopy. I see it mentioned in visit notes but I cannot find order in Epic.    T'd up new order, will call her with scheduling info once placed.    Claribel GAO RN

## 2022-09-07 ENCOUNTER — TELEPHONE (OUTPATIENT)
Dept: GASTROENTEROLOGY | Facility: CLINIC | Age: 37
End: 2022-09-07

## 2022-09-07 DIAGNOSIS — Z20.822 SUSPECTED 2019 NOVEL CORONAVIRUS INFECTION: Primary | ICD-10-CM

## 2022-09-07 NOTE — TELEPHONE ENCOUNTER
Screening Questions    BlueKIND OF PREP RedLOCATION [review exclusion criteria] GreenSEDATION TYPE      1. Are you active on mychart? Y    2. What insurance is in the chart? PREF ONE     3.   Ordering/Referring Provider: Jaye Rubin APRN CNP     4. BMI   (If greater than 40 review exclusion criteria [PAC APPT IF [MAC] @ U)  22.5  [If yes, BMI OVER 40-EXTENDED PREP]      **(Sedation review/consideration needed)**  Do you have a legal guardian or Medical Power of    and/or are you able to give consent for your medical care?     Y    5. Have you had a positive covid test in the last 90 days?   N -     6.  Are you currently on dialysis?   N [ If yes, G-PREP & HOSPITAL setting ONLY]     7.  Do you have chronic kidney disease?  N [ If yes, G-PREP ]    8.   Do you have a diagnosis of diabetes?   N   [ If yes, G-PREP ]    9.  On a regular basis do you go 3-5 days between bowel movements?   N   [ If yes, EXTENDED PREP]    10.  Are you taking any prescription pain medications on a routine schedule?    N -  [ If yes, EXTENDED PREP] [If yes, MAC]      11.   Do you have any chemical dependencies such as alcohol, street drugs, or methadone?    N [If yes, MAC]    12.   Do you have any history of post-traumatic stress syndrome, severe anxiety or history of psychosis?    N  [If yes, MAC]    13.  [FEMALES] Are you currently pregnant? N    If yes, how many weeks?       Respiratory/Heart Screening:  [If yes to any of the following HOSPITAL setting only]     14. Do you have Pulmonary Hypertension [Lungs]?   N       15. Do you have UNCONTROLLED asthma?   N     16.  Do you use daily home oxygen?  N      17. Do you have mod to severe Obstructive Sleep Apnea?         (OKAY @  [facility]  SH  PH  RI  MG - if pt is not on OXYGEN)  N      18.   Have you had a heart or lung transplant?   N      19.   Have you had a stroke or Transient ischemic attack (TIA - aka  mini stroke ) within 6 months?  (If yes, please review exclusion  criteria)  N     20.   In the past 6 months, have you had any heart related issues including cardiomyopathy or heart attack?   N           If yes, did it require cardiac stenting or other implantable device?   NA      21.   Do you have any implantable devices in your body (pacemaker, defib, LVAD)? (If yes, please review exclusion criteria)  N     22.  Do you take the medication Phentermine?  NO        23. Do you take nitroglycerin?   N           If yes, how often? NA  (if yes, HOSPITAL setting ONLY)    24.  Are you currently taking any blood thinners?    [If yes, INFORM patient to [facility] w/ ORDERING PROVIDER FOR BRIDGING INSTRUCTIONS]     N    25.   Do you transfer independently?                (If NO, please HOSPITAL setting ONLY)  Y    26.   Preferred LOCAL Pharmacy for Pre Prescription:      [id number] [place] [address]    Scheduling Details  (Please ask for phone number if not scheduled by patient)      Caller :Shyala Nolasco    Date of Procedure: 10/7  Surgeon: CADE  Location:         Sedation Type: CS l PER PROTOCOL  Conscious Sedation- Needs  for 6 hours after the procedure  MAC/General-Needs  for 24 hours after procedure    N :[Pre-op Required] at [facility]  MG and OR for MAC sedation   (advise patient they will need a pre-op WITH IN 30 DAYS of procedure date)     Type of Procedure Scheduled:   Upper Endoscopy [EGD]    Which Colonoscopy Prep was Sent?:   ALEJA -     JESUS CF PATIENTS & GROEN'S PATIENTS NEEDS EXTENDED PREP       Informed patient they will need an adult  Y  Cannot take any type of public or medical transportation alone    Pre-Procedure Covid test to be completed at [facility] or Externally: Y  **INFORMED OF HOME TESTING & LAB OPTION**        Confirmed Nurse will call to complete assessment Y    Additional comments:

## 2022-09-12 ENCOUNTER — TELEPHONE (OUTPATIENT)
Dept: GASTROENTEROLOGY | Facility: CLINIC | Age: 37
End: 2022-09-12

## 2022-09-12 NOTE — TELEPHONE ENCOUNTER
Caller: Shayla    Procedure: EGD    Date, Location, and Surgeon of Procedure Cancelled: 10/7/22  Mario    Ordering Provider:Scottie    Reason for cancel (please be detailed, any staff messages or encounters to note?): Patient        Rescheduled: Yes     If rescheduled:    Date: 10/12/22   Location:    Prep Resent: No Changes (changes to prep?)   Covid Test Rescheduled: No-home test   Note any change or update to original order/sedation: None

## 2022-10-03 ENCOUNTER — TELEPHONE (OUTPATIENT)
Dept: GASTROENTEROLOGY | Facility: CLINIC | Age: 37
End: 2022-10-03

## 2022-10-03 NOTE — TELEPHONE ENCOUNTER
Pre assessment questions completed for upcoming EGD procedure scheduled on 10.12.2022    Discussed at home rapid antigen COVID test 1-2 days prior to procedure.    Reviewed procedural arrival time 1415 and facility location .    Designated  policy reviewed. Instructed to have someone stay 6 hours post procedure.     Anticoagulation/blood thinners? no    Electronic implanted devices? no    Reviewed EGD prep instructions with patient.     Patient verbalized understanding and had no questions or concerns at this time.    Ronna Bello RN

## 2022-10-03 NOTE — TELEPHONE ENCOUNTER
Attempted to contact patient regarding upcoming EGD procedure on 10/12/22 for pre assessment questions. No answer.     Left message to return call to [phone] #4    Covid test scheduled? No Discuss at home rapid antigen COVID test 1-2 days prior to procedure.    Arrival time: 1415    Facility location:      Sedation type: CS     Indication for procedure: esophageal pain    Anticoagulants: no.    Lina Galaviz RN

## 2022-10-12 ENCOUNTER — TELEPHONE (OUTPATIENT)
Dept: GASTROENTEROLOGY | Facility: CLINIC | Age: 37
End: 2022-10-12

## 2022-10-12 NOTE — TELEPHONE ENCOUNTER
CANCEL - RESCHEDULE    Ordering Provider: Jaye Rubin APRN CNP  Procedure Cancelled: EGD  Procedure Date Cancelled: 10/12  Surgeon of Procedure Cancelled: ZONIA  Sedation type: MOD   Location of Procedure Cancelled:       Rescheduled: YES     If rescheduled:    Date: 10/19   Location:    Sedation Type: MOD   PAC / Pre-op Required  NO   PREP TYPE: --   Covid Test [ESSC - PCR IN FACILITY] HOME   Note any change or update to original order/sedation:    Preferred LOCAL Pharmacy for Pre Prescription         Reason for cancel (please be detailed, any staff messages or encounters to note? SCHED ERROR? FACILITY/ SEDATION CHANGE? ):   PT HAS A LITTLE COLD AND COVID TEST WAS NEGATIVE.       Details and Prep sent via [facility]    Additional details:

## 2022-10-12 NOTE — TELEPHONE ENCOUNTER
Procedure rescheduled.     Patient scheduled for EGD on 10/19/22.     Covid test scheduled no. Discuss at home test option.     Arrival time: 1220    Facility location:     Attempted to contact to discuss updated time and date.     Left message to return call [phone] #4    Lina Galaviz RN

## 2022-10-19 ENCOUNTER — HOSPITAL ENCOUNTER (OUTPATIENT)
Facility: CLINIC | Age: 37
Discharge: HOME | End: 2022-10-19
Attending: INTERNAL MEDICINE | Admitting: INTERNAL MEDICINE
Payer: COMMERCIAL

## 2022-10-19 VITALS
RESPIRATION RATE: 20 BRPM | SYSTOLIC BLOOD PRESSURE: 138 MMHG | OXYGEN SATURATION: 99 % | HEART RATE: 46 BPM | HEIGHT: 65 IN | WEIGHT: 135 LBS | BODY MASS INDEX: 22.49 KG/M2 | DIASTOLIC BLOOD PRESSURE: 74 MMHG

## 2022-10-19 DIAGNOSIS — K21.9 GASTROESOPHAGEAL REFLUX DISEASE WITHOUT ESOPHAGITIS: Primary | ICD-10-CM

## 2022-10-19 LAB — UPPER GI ENDOSCOPY: NORMAL

## 2022-10-19 PROCEDURE — 43239 EGD BIOPSY SINGLE/MULTIPLE: CPT | Performed by: INTERNAL MEDICINE

## 2022-10-19 PROCEDURE — 88305 TISSUE EXAM BY PATHOLOGIST: CPT | Mod: TC | Performed by: INTERNAL MEDICINE

## 2022-10-19 PROCEDURE — 43249 ESOPH EGD DILATION <30 MM: CPT | Performed by: INTERNAL MEDICINE

## 2022-10-19 PROCEDURE — 250N000011 HC RX IP 250 OP 636: Performed by: INTERNAL MEDICINE

## 2022-10-19 PROCEDURE — 250N000009 HC RX 250: Performed by: INTERNAL MEDICINE

## 2022-10-19 PROCEDURE — G0500 MOD SEDAT ENDO SERVICE >5YRS: HCPCS | Performed by: INTERNAL MEDICINE

## 2022-10-19 RX ORDER — LIDOCAINE 40 MG/G
CREAM TOPICAL
Status: DISCONTINUED | OUTPATIENT
Start: 2022-10-19 | End: 2022-10-19 | Stop reason: HOSPADM

## 2022-10-19 RX ORDER — NALOXONE HYDROCHLORIDE 0.4 MG/ML
0.4 INJECTION, SOLUTION INTRAMUSCULAR; INTRAVENOUS; SUBCUTANEOUS
Status: DISCONTINUED | OUTPATIENT
Start: 2022-10-19 | End: 2022-10-19 | Stop reason: HOSPADM

## 2022-10-19 RX ORDER — PROCHLORPERAZINE MALEATE 10 MG
10 TABLET ORAL EVERY 6 HOURS PRN
Status: DISCONTINUED | OUTPATIENT
Start: 2022-10-19 | End: 2022-10-19 | Stop reason: HOSPADM

## 2022-10-19 RX ORDER — NALOXONE HYDROCHLORIDE 0.4 MG/ML
0.2 INJECTION, SOLUTION INTRAMUSCULAR; INTRAVENOUS; SUBCUTANEOUS
Status: DISCONTINUED | OUTPATIENT
Start: 2022-10-19 | End: 2022-10-19 | Stop reason: HOSPADM

## 2022-10-19 RX ORDER — OMEPRAZOLE 40 MG/1
40 CAPSULE, DELAYED RELEASE ORAL DAILY
Qty: 60 CAPSULE | Refills: 1 | Status: SHIPPED | OUTPATIENT
Start: 2022-10-19 | End: 2022-11-30

## 2022-10-19 RX ORDER — ONDANSETRON 4 MG/1
4 TABLET, ORALLY DISINTEGRATING ORAL EVERY 6 HOURS PRN
Status: DISCONTINUED | OUTPATIENT
Start: 2022-10-19 | End: 2022-10-19 | Stop reason: HOSPADM

## 2022-10-19 RX ORDER — FLUMAZENIL 0.1 MG/ML
0.2 INJECTION, SOLUTION INTRAVENOUS
Status: DISCONTINUED | OUTPATIENT
Start: 2022-10-19 | End: 2022-10-19 | Stop reason: HOSPADM

## 2022-10-19 RX ORDER — ONDANSETRON 2 MG/ML
4 INJECTION INTRAMUSCULAR; INTRAVENOUS
Status: DISCONTINUED | OUTPATIENT
Start: 2022-10-19 | End: 2022-10-19 | Stop reason: HOSPADM

## 2022-10-19 RX ORDER — ONDANSETRON 2 MG/ML
4 INJECTION INTRAMUSCULAR; INTRAVENOUS EVERY 6 HOURS PRN
Status: DISCONTINUED | OUTPATIENT
Start: 2022-10-19 | End: 2022-10-19 | Stop reason: HOSPADM

## 2022-10-19 RX ORDER — FENTANYL CITRATE 50 UG/ML
INJECTION, SOLUTION INTRAMUSCULAR; INTRAVENOUS PRN
Status: DISCONTINUED | OUTPATIENT
Start: 2022-10-19 | End: 2022-10-19 | Stop reason: HOSPADM

## 2022-10-19 ASSESSMENT — ACTIVITIES OF DAILY LIVING (ADL): ADLS_ACUITY_SCORE: 35

## 2022-10-19 NOTE — H&P
Shayla M Constance  [id number]  female  36 year old      Reason for procedure/surgery: dysphagia    Patient Active Problem List   Diagnosis     QUEENIE III (cervical intraepithelial neoplasia III)     Family history of ovarian cancer     Family history of BRCA gene positive     Mixed anxiety depressive disorder     Hashimoto's disease     Gastroesophageal reflux disease without esophagitis       Past Surgical History:    Past Surgical History:   Procedure Laterality Date     CL AFF SURGICAL PATHOLOGY  10/2007    LEEP     CL AFF SURGICAL PATHOLOGY  9/2007       Past Medical History:   Past Medical History:   Diagnosis Date     ASCUS favoring dysplasia 9/2011    colp - QUEENIE I     QUEENIE III (cervical intraepithelial neoplasia III) 10/07    Had a LEEP then at at first visit with us has had one NIL pap in 2/08; LSIL PAP in 7/08 and colp with Negative biopses, first diagnostic PAP 1/09:  NIL       Social History:   Social History     Tobacco Use     Smoking status: Never     Smokeless tobacco: Never   Substance Use Topics     Alcohol use: Not Currently     Alcohol/week: 6.0 standard drinks     Types: 6 Glasses of wine per week       Family History:   Family History   Problem Relation Age of Onset     Diabetes Mother         IDDM (type one)     Cancer Mother 51        Ovarian Cancer (3 times)     Diabetes Maternal Grandmother         IDDM (adult onset) Type 2     Hypertension Maternal Grandmother      Prostate Cancer Paternal Grandfather      Cancer - colorectal Paternal Grandfather         age 70's (had stomach Ca as a younger man)     Neurologic Disorder Father         migraines     Gastrointestinal Disease Father         stomach ulcers     Lipids Maternal Grandfather         high cholesterol     Circulatory Maternal Grandfather         blood clots     C.A.D. Maternal Grandfather         MI     Skin Cancer No family hx of         no skin cancer       Allergies:   Allergies   Allergen Reactions     Sulfa Drugs Hives      "Formaldehyde Rash     Lanolin Rash     Other [No Clinical Screening - See Comments] Rash     Carba mix       Active Medications:   No current outpatient medications on file.       Systemic Review:   CONSTITUTIONAL: NEGATIVE for fever, chills, change in weight  ENT/MOUTH: NEGATIVE for ear, mouth and throat problems  RESP: NEGATIVE for significant cough or SOB  CV: NEGATIVE for chest pain, palpitations or peripheral edema    Physical Examination:   Vital Signs: /82   Resp 12   Ht 1.651 m (5' 5\")   Wt 61.2 kg (135 lb)   SpO2 100%   BMI 22.47 kg/m    GENERAL: healthy, alert and no distress  NECK: no adenopathy, no asymmetry, masses, or scars  RESP: lungs clear to auscultation - no rales, rhonchi or wheezes  CV: regular rate and rhythm, normal S1 S2, no S3 or S4, no murmur, click or rub, no peripheral edema and peripheral pulses strong  ABDOMEN: soft, nontender, no hepatosplenomegaly, no masses and bowel sounds normal  MS: no gross musculoskeletal defects noted, no edema    ASA Classification: (I)  Normal healthy patient  Airway Exam: Mallampati Score: Class II (Complete visualization of uvula)    Plan: Appropriate to proceed as scheduled.      David Bridges MD  10/19/2022    PCP:  Agnieszka Villarreal    "

## 2022-10-20 LAB
PATH REPORT.COMMENTS IMP SPEC: NORMAL
PATH REPORT.COMMENTS IMP SPEC: NORMAL
PATH REPORT.FINAL DX SPEC: NORMAL
PATH REPORT.GROSS SPEC: NORMAL
PATH REPORT.MICROSCOPIC SPEC OTHER STN: NORMAL
PATH REPORT.RELEVANT HX SPEC: NORMAL
PHOTO IMAGE: NORMAL

## 2022-10-20 PROCEDURE — 88305 TISSUE EXAM BY PATHOLOGIST: CPT | Mod: 26 | Performed by: PATHOLOGY

## 2022-11-02 ENCOUNTER — TELEPHONE (OUTPATIENT)
Dept: FAMILY MEDICINE | Facility: CLINIC | Age: 37
End: 2022-11-02

## 2022-11-02 NOTE — TELEPHONE ENCOUNTER
Reason for Call:  Other appointment    Detailed comments: patient would like a TSH ordered for her upcoming appt.     Phone Number Patient can be reached at: Home number on file [phone] (home)    Best Time: Any time     Can we leave a detailed message on this number? YES    Call taken on 11/2/2022 at 8:27 AM by Rosmery Cruz

## 2022-11-02 NOTE — TELEPHONE ENCOUNTER
Triage,   See message below and advise.  Has appt w/ FS on 11/30/22 for office visit to establish care/labs  Thanks!  Coleen VAN

## 2022-11-02 NOTE — TELEPHONE ENCOUNTER
Patient has never seen FS.  Will need to wait until appointment to see if FS wants other labs done.    VM left informing patient.    Kiara Vance RN

## 2022-11-30 ENCOUNTER — OFFICE VISIT (OUTPATIENT)
Dept: FAMILY MEDICINE | Facility: CLINIC | Age: 37
End: 2022-11-30
Payer: COMMERCIAL

## 2022-11-30 VITALS
RESPIRATION RATE: 16 BRPM | HEIGHT: 66 IN | TEMPERATURE: 98.9 F | HEART RATE: 63 BPM | BODY MASS INDEX: 21 KG/M2 | DIASTOLIC BLOOD PRESSURE: 81 MMHG | OXYGEN SATURATION: 98 % | WEIGHT: 130.7 LBS | SYSTOLIC BLOOD PRESSURE: 120 MMHG

## 2022-11-30 DIAGNOSIS — Z23 NEED FOR DIPHTHERIA-TETANUS-PERTUSSIS (TDAP) VACCINE: ICD-10-CM

## 2022-11-30 DIAGNOSIS — E06.3 HASHIMOTO'S DISEASE: Primary | ICD-10-CM

## 2022-11-30 LAB
T3FREE SERPL-MCNC: 2.8 PG/ML (ref 2–4.4)
T4 FREE SERPL-MCNC: 1.08 NG/DL (ref 0.9–1.7)
TSH SERPL DL<=0.005 MIU/L-ACNC: 6.14 UIU/ML (ref 0.3–4.2)

## 2022-11-30 PROCEDURE — 84481 FREE ASSAY (FT-3): CPT | Performed by: FAMILY MEDICINE

## 2022-11-30 PROCEDURE — 84443 ASSAY THYROID STIM HORMONE: CPT | Performed by: FAMILY MEDICINE

## 2022-11-30 PROCEDURE — 90471 IMMUNIZATION ADMIN: CPT | Performed by: FAMILY MEDICINE

## 2022-11-30 PROCEDURE — 84439 ASSAY OF FREE THYROXINE: CPT | Performed by: FAMILY MEDICINE

## 2022-11-30 PROCEDURE — 86376 MICROSOMAL ANTIBODY EACH: CPT | Performed by: FAMILY MEDICINE

## 2022-11-30 PROCEDURE — 36415 COLL VENOUS BLD VENIPUNCTURE: CPT | Performed by: FAMILY MEDICINE

## 2022-11-30 PROCEDURE — 99214 OFFICE O/P EST MOD 30 MIN: CPT | Mod: 25 | Performed by: FAMILY MEDICINE

## 2022-11-30 PROCEDURE — 90715 TDAP VACCINE 7 YRS/> IM: CPT | Performed by: FAMILY MEDICINE

## 2022-11-30 ASSESSMENT — ANXIETY QUESTIONNAIRES
5. BEING SO RESTLESS THAT IT IS HARD TO SIT STILL: NOT AT ALL
4. TROUBLE RELAXING: SEVERAL DAYS
3. WORRYING TOO MUCH ABOUT DIFFERENT THINGS: SEVERAL DAYS
7. FEELING AFRAID AS IF SOMETHING AWFUL MIGHT HAPPEN: NOT AT ALL
8. IF YOU CHECKED OFF ANY PROBLEMS, HOW DIFFICULT HAVE THESE MADE IT FOR YOU TO DO YOUR WORK, TAKE CARE OF THINGS AT HOME, OR GET ALONG WITH OTHER PEOPLE?: SOMEWHAT DIFFICULT
GAD7 TOTAL SCORE: 5
1. FEELING NERVOUS, ANXIOUS, OR ON EDGE: SEVERAL DAYS
GAD7 TOTAL SCORE: 5
GAD7 TOTAL SCORE: 5
2. NOT BEING ABLE TO STOP OR CONTROL WORRYING: SEVERAL DAYS
7. FEELING AFRAID AS IF SOMETHING AWFUL MIGHT HAPPEN: NOT AT ALL
IF YOU CHECKED OFF ANY PROBLEMS ON THIS QUESTIONNAIRE, HOW DIFFICULT HAVE THESE PROBLEMS MADE IT FOR YOU TO DO YOUR WORK, TAKE CARE OF THINGS AT HOME, OR GET ALONG WITH OTHER PEOPLE: SOMEWHAT DIFFICULT
6. BECOMING EASILY ANNOYED OR IRRITABLE: SEVERAL DAYS

## 2022-11-30 ASSESSMENT — PATIENT HEALTH QUESTIONNAIRE - PHQ9
SUM OF ALL RESPONSES TO PHQ QUESTIONS 1-9: 6
SUM OF ALL RESPONSES TO PHQ QUESTIONS 1-9: 6
10. IF YOU CHECKED OFF ANY PROBLEMS, HOW DIFFICULT HAVE THESE PROBLEMS MADE IT FOR YOU TO DO YOUR WORK, TAKE CARE OF THINGS AT HOME, OR GET ALONG WITH OTHER PEOPLE: NOT DIFFICULT AT ALL

## 2022-11-30 ASSESSMENT — PAIN SCALES - GENERAL: PAINLEVEL: NO PAIN (0)

## 2022-11-30 NOTE — PROGRESS NOTES
Assessment & Plan     Shayla was seen today for establish care and thyroid problem.    Diagnoses and all orders for this visit:    Hashimoto's disease  Assessment: Most recent TSH level is mildly elevated.  I recommended increasing her dose of levothyroxine.  The patient is currently taking levothyroxine 25 mcg once daily.  I recommended increasing her dose to levothyroxine 50 mcg once daily.  Plan:  -     TSH; Future  -     T3, Free; Future  -     T4, free; Future  -     Thyroid peroxidase antibody; Future    Need for diphtheria-tetanus-pertussis (Tdap) vaccine  -     TDAP VACCINE (Adacel, Boostrix)  [[id number]]      No follow-ups on file.    Owen Lane MD  [facility]   Shayla is a 36 year old, presenting for the following health issues:  Establish Care and Thyroid Problem      History of Present Illness       Hypothyroidism:     Since last visit, patient describes the following symptoms::  Anxiety, Dry skin, Fatigue and Hair loss    She eats 2-3 servings of fruits and vegetables daily.She consumes 0 sweetened beverage(s) daily.She exercises with enough effort to increase her heart rate 30 to 60 minutes per day.  She exercises with enough effort to increase her heart rate 5 days per week.   She is taking medications regularly.    Today's PHQ-9         PHQ-9 Total Score: 6    PHQ-9 Q9 Thoughts of better off dead/self-harm past 2 weeks :   Not at all    How difficult have these problems made it for you to do your work, take care of things at home, or get along with other people: Not difficult at all  Today's CATE-7 Score: 5  Patient had a TSH checked a few months ago and the level was mildly elevated.  Desires to recheck her TSH level again today.  Pleasant patient who  maintain a very clean diet and high level of physical activity.    Review of Systems   Constitutional, HEENT, cardiovascular, pulmonary, gi and gu systems are negative, except as otherwise noted.      Objective   "  /81   Pulse 63   Temp 98.9  F (37.2  C) (Tympanic)   Resp 16   Ht 1.683 m (5' 6.25\")   Wt 59.3 kg (130 lb 11.2 oz)   LMP 11/20/2022 (Exact Date)   SpO2 98%   BMI 20.94 kg/m    Body mass index is 20.94 kg/m .  Physical Exam   GENERAL: healthy, alert and no distress  NECK: no adenopathy, no asymmetry, masses, or scars and thyroid normal to palpation  RESP: lungs clear to auscultation - no rales, rhonchi or wheezes  CV: regular rate and rhythm, normal S1 S2, no S3 or S4, no murmur, click or rub, no peripheral edema and peripheral pulses strong  MS: no gross musculoskeletal defects noted, no edema    Results for orders placed or performed in visit on 11/30/22   TSH     Status: Abnormal   Result Value Ref Range    TSH 6.14 (H) 0.30 - 4.20 uIU/mL   T3, Free     Status: Normal   Result Value Ref Range    T3 Free 2.8 2.0 - 4.4 pg/mL   T4, free     Status: Normal   Result Value Ref Range    Free T4 1.08 0.90 - 1.70 ng/dL   Thyroid peroxidase antibody     Status: Abnormal   Result Value Ref Range    Thyroid Peroxidase Antibody 235 (H) <35 IU/mL                 "

## 2022-12-01 DIAGNOSIS — E06.3 HASHIMOTO'S DISEASE: Primary | ICD-10-CM

## 2022-12-01 LAB — THYROPEROXIDASE AB SERPL-ACNC: 235 IU/ML

## 2022-12-01 RX ORDER — LEVOTHYROXINE SODIUM 50 UG/1
50 TABLET ORAL DAILY
Qty: 60 TABLET | Refills: 0 | Status: SHIPPED | OUTPATIENT
Start: 2022-12-01 | End: 2023-02-28

## 2023-01-11 ENCOUNTER — TELEPHONE (OUTPATIENT)
Dept: FAMILY MEDICINE | Facility: CLINIC | Age: 38
End: 2023-01-11

## 2023-01-11 DIAGNOSIS — E06.3 HASHIMOTO'S DISEASE: Primary | ICD-10-CM

## 2023-01-11 NOTE — TELEPHONE ENCOUNTER
Patient requesting thyroid lab only orders  Scheduled 1/30/23  Order pended if you approve  Keri WILKES RN

## 2023-01-30 ENCOUNTER — LAB (OUTPATIENT)
Dept: LAB | Facility: CLINIC | Age: 38
End: 2023-01-30
Payer: COMMERCIAL

## 2023-01-30 DIAGNOSIS — E06.3 HASHIMOTO'S DISEASE: ICD-10-CM

## 2023-01-30 LAB
T3FREE SERPL-MCNC: 2.2 PG/ML (ref 2–4.4)
T4 FREE SERPL-MCNC: 1.13 NG/DL (ref 0.9–1.7)
TSH SERPL DL<=0.005 MIU/L-ACNC: 2.63 UIU/ML (ref 0.3–4.2)

## 2023-01-30 PROCEDURE — 84439 ASSAY OF FREE THYROXINE: CPT

## 2023-01-30 PROCEDURE — 84481 FREE ASSAY (FT-3): CPT

## 2023-01-30 PROCEDURE — 36415 COLL VENOUS BLD VENIPUNCTURE: CPT

## 2023-01-30 PROCEDURE — 84443 ASSAY THYROID STIM HORMONE: CPT

## 2023-03-06 ENCOUNTER — MYC REFILL (OUTPATIENT)
Dept: FAMILY MEDICINE | Facility: CLINIC | Age: 38
End: 2023-03-06
Payer: COMMERCIAL

## 2023-03-06 DIAGNOSIS — E06.3 HASHIMOTO'S DISEASE: ICD-10-CM

## 2023-03-06 RX ORDER — LEVOTHYROXINE SODIUM 50 UG/1
50 TABLET ORAL DAILY
Qty: 90 TABLET | Refills: 0 | Status: SHIPPED | OUTPATIENT
Start: 2023-03-06 | End: 2023-05-19

## 2023-03-06 NOTE — TELEPHONE ENCOUNTER
Patient called for refill for Levothyroxine refill.  Not due for physical until August 2023.    TSH normal on 1/30/23.  Prescription approved per [facility] Refill Protocol.  Kiara Vance RN

## 2023-03-06 NOTE — TELEPHONE ENCOUNTER
Patient is completely out.   Wondering if she can fill meds today.  Please advise.   Thanks!  Coleen VAN

## 2023-08-21 DIAGNOSIS — E06.3 HASHIMOTO'S DISEASE: ICD-10-CM

## 2023-08-21 RX ORDER — LEVOTHYROXINE SODIUM 50 UG/1
50 TABLET ORAL DAILY
Qty: 30 TABLET | Refills: 0 | Status: SHIPPED | OUTPATIENT
Start: 2023-08-21 | End: 2023-08-31

## 2023-08-21 NOTE — TELEPHONE ENCOUNTER
Prescription approved per [facility] Refill Protocol.  1 month refill only; patient due for appointment (physical)  Angela KNUTSON RN

## 2023-08-22 ENCOUNTER — MYC REFILL (OUTPATIENT)
Dept: FAMILY MEDICINE | Facility: CLINIC | Age: 38
End: 2023-08-22
Payer: COMMERCIAL

## 2023-08-22 DIAGNOSIS — E06.3 HASHIMOTO'S DISEASE: ICD-10-CM

## 2023-08-22 RX ORDER — LEVOTHYROXINE SODIUM 50 UG/1
50 TABLET ORAL DAILY
Qty: 1 TABLET | Refills: 0 | OUTPATIENT
Start: 2023-08-22

## 2023-08-31 NOTE — TELEPHONE ENCOUNTER
Pt calling requesting a 90 day supply due to insurance only covering a 90 day.    Thanks!  Josh Jordan RN   [facility]

## 2023-09-01 RX ORDER — LEVOTHYROXINE SODIUM 50 UG/1
50 TABLET ORAL DAILY
Qty: 90 TABLET | Refills: 0 | Status: SHIPPED | OUTPATIENT
Start: 2023-09-01 | End: 2023-11-21

## 2023-09-23 ENCOUNTER — HEALTH MAINTENANCE LETTER (OUTPATIENT)
Age: 38
End: 2023-09-23

## 2023-10-30 ENCOUNTER — OFFICE VISIT (OUTPATIENT)
Dept: FAMILY MEDICINE | Facility: CLINIC | Age: 38
End: 2023-10-30
Payer: COMMERCIAL

## 2023-10-30 VITALS
HEART RATE: 54 BPM | SYSTOLIC BLOOD PRESSURE: 124 MMHG | BODY MASS INDEX: 22.74 KG/M2 | OXYGEN SATURATION: 99 % | HEIGHT: 66 IN | RESPIRATION RATE: 16 BRPM | TEMPERATURE: 98.4 F | WEIGHT: 141.5 LBS | DIASTOLIC BLOOD PRESSURE: 78 MMHG

## 2023-10-30 DIAGNOSIS — Z12.31 ENCOUNTER FOR SCREENING MAMMOGRAM FOR MALIGNANT NEOPLASM OF BREAST: ICD-10-CM

## 2023-10-30 DIAGNOSIS — Z12.4 CERVICAL CANCER SCREENING: ICD-10-CM

## 2023-10-30 DIAGNOSIS — Z00.00 ROUTINE GENERAL MEDICAL EXAMINATION AT A HEALTH CARE FACILITY: Primary | ICD-10-CM

## 2023-10-30 LAB
ALBUMIN SERPL BCG-MCNC: 4.4 G/DL (ref 3.5–5.2)
ALP SERPL-CCNC: 53 U/L (ref 35–104)
ALT SERPL W P-5'-P-CCNC: 12 U/L (ref 0–50)
ANION GAP SERPL CALCULATED.3IONS-SCNC: 10 MMOL/L (ref 7–15)
AST SERPL W P-5'-P-CCNC: 19 U/L (ref 0–45)
BILIRUB SERPL-MCNC: 0.6 MG/DL
BUN SERPL-MCNC: 12.6 MG/DL (ref 6–20)
CALCIUM SERPL-MCNC: 9.1 MG/DL (ref 8.6–10)
CHLORIDE SERPL-SCNC: 106 MMOL/L (ref 98–107)
CREAT SERPL-MCNC: 0.81 MG/DL (ref 0.51–0.95)
DEPRECATED HCO3 PLAS-SCNC: 24 MMOL/L (ref 22–29)
EGFRCR SERPLBLD CKD-EPI 2021: >90 ML/MIN/1.73M2
ERYTHROCYTE [DISTWIDTH] IN BLOOD BY AUTOMATED COUNT: 12.3 % (ref 10–15)
FERRITIN SERPL-MCNC: 79 NG/ML (ref 6–175)
GLUCOSE SERPL-MCNC: 95 MG/DL (ref 70–99)
HCT VFR BLD AUTO: 39 % (ref 35–47)
HGB BLD-MCNC: 13.4 G/DL (ref 11.7–15.7)
MCH RBC QN AUTO: 30.9 PG (ref 26.5–33)
MCHC RBC AUTO-ENTMCNC: 34.4 G/DL (ref 31.5–36.5)
MCV RBC AUTO: 90 FL (ref 78–100)
PLATELET # BLD AUTO: 400 10E3/UL (ref 150–450)
POTASSIUM SERPL-SCNC: 4.5 MMOL/L (ref 3.4–5.3)
PROT SERPL-MCNC: 6.7 G/DL (ref 6.4–8.3)
RBC # BLD AUTO: 4.34 10E6/UL (ref 3.8–5.2)
SODIUM SERPL-SCNC: 140 MMOL/L (ref 135–145)
T3 SERPL-MCNC: 72 NG/DL (ref 85–202)
T4 FREE SERPL-MCNC: 1.19 NG/DL (ref 0.9–1.7)
TSH SERPL DL<=0.005 MIU/L-ACNC: 2.91 UIU/ML (ref 0.3–4.2)
WBC # BLD AUTO: 7.2 10E3/UL (ref 4–11)

## 2023-10-30 PROCEDURE — 82728 ASSAY OF FERRITIN: CPT | Performed by: FAMILY MEDICINE

## 2023-10-30 PROCEDURE — 84480 ASSAY TRIIODOTHYRONINE (T3): CPT | Performed by: FAMILY MEDICINE

## 2023-10-30 PROCEDURE — 85027 COMPLETE CBC AUTOMATED: CPT | Performed by: FAMILY MEDICINE

## 2023-10-30 PROCEDURE — 84443 ASSAY THYROID STIM HORMONE: CPT | Performed by: FAMILY MEDICINE

## 2023-10-30 PROCEDURE — 84439 ASSAY OF FREE THYROXINE: CPT | Performed by: FAMILY MEDICINE

## 2023-10-30 PROCEDURE — 99395 PREV VISIT EST AGE 18-39: CPT | Performed by: FAMILY MEDICINE

## 2023-10-30 PROCEDURE — 36415 COLL VENOUS BLD VENIPUNCTURE: CPT | Performed by: FAMILY MEDICINE

## 2023-10-30 PROCEDURE — 80053 COMPREHEN METABOLIC PANEL: CPT | Performed by: FAMILY MEDICINE

## 2023-10-30 ASSESSMENT — PAIN SCALES - GENERAL: PAINLEVEL: NO PAIN (0)

## 2023-10-30 ASSESSMENT — ENCOUNTER SYMPTOMS
HEMATURIA: 0
CONSTIPATION: 0
WEAKNESS: 0
DIARRHEA: 0
SHORTNESS OF BREATH: 0
NAUSEA: 0
FREQUENCY: 0
HEMATOCHEZIA: 0
COUGH: 0
EYE PAIN: 0
MYALGIAS: 0
HEADACHES: 0
PARESTHESIAS: 0
BREAST MASS: 0
NERVOUS/ANXIOUS: 0
SORE THROAT: 0
DIZZINESS: 0
DYSURIA: 0
PALPITATIONS: 0
ARTHRALGIAS: 0
FEVER: 0
ABDOMINAL PAIN: 0
HEARTBURN: 0
JOINT SWELLING: 0
CHILLS: 0

## 2023-10-30 ASSESSMENT — ANXIETY QUESTIONNAIRES
GAD7 TOTAL SCORE: 1
3. WORRYING TOO MUCH ABOUT DIFFERENT THINGS: NOT AT ALL
IF YOU CHECKED OFF ANY PROBLEMS ON THIS QUESTIONNAIRE, HOW DIFFICULT HAVE THESE PROBLEMS MADE IT FOR YOU TO DO YOUR WORK, TAKE CARE OF THINGS AT HOME, OR GET ALONG WITH OTHER PEOPLE: SOMEWHAT DIFFICULT
5. BEING SO RESTLESS THAT IT IS HARD TO SIT STILL: NOT AT ALL
4. TROUBLE RELAXING: NOT AT ALL
1. FEELING NERVOUS, ANXIOUS, OR ON EDGE: SEVERAL DAYS
7. FEELING AFRAID AS IF SOMETHING AWFUL MIGHT HAPPEN: NOT AT ALL
2. NOT BEING ABLE TO STOP OR CONTROL WORRYING: NOT AT ALL
6. BECOMING EASILY ANNOYED OR IRRITABLE: NOT AT ALL
GAD7 TOTAL SCORE: 1

## 2023-10-30 ASSESSMENT — PATIENT HEALTH QUESTIONNAIRE - PHQ9
SUM OF ALL RESPONSES TO PHQ QUESTIONS 1-9: 3
SUM OF ALL RESPONSES TO PHQ QUESTIONS 1-9: 3
10. IF YOU CHECKED OFF ANY PROBLEMS, HOW DIFFICULT HAVE THESE PROBLEMS MADE IT FOR YOU TO DO YOUR WORK, TAKE CARE OF THINGS AT HOME, OR GET ALONG WITH OTHER PEOPLE: NOT DIFFICULT AT ALL

## 2023-10-30 NOTE — PROGRESS NOTES
SUBJECTIVE:   CC: Shayla is an 37 year old who presents for preventive health visit.       10/30/2023     9:59 AM   Additional Questions   Roomed by Karla GONZALES   Accompanied by n/a       Healthy Habits:     Getting at least 3 servings of Calcium per day:  Yes    Bi-annual eye exam:  NO    Dental care twice a year:  Yes    Sleep apnea or symptoms of sleep apnea:  None    Diet:  Regular (no restrictions)    Frequency of exercise:  4-5 days/week    Duration of exercise:  30-45 minutes    Taking medications regularly:  Yes    Medication side effects:  None    Additional concerns today:  Yes    Pt would like discuss birth control.  had a vasectomy but she desires to avoid getting menstrual cycles. Reports mood changes and cramping during her cycles.     Today's PHQ-9 Score:       10/30/2023     9:45 AM   PHQ-9 SCORE   PHQ-9 Total Score MyChart 3 (Minimal depression)   PHQ-9 Total Score 3     Social History     Tobacco Use    Smoking status: Never    Smokeless tobacco: Never   Substance Use Topics    Alcohol use: Not Currently     Alcohol/week: 3.0 standard drinks of alcohol     Types: 3 Standard drinks or equivalent per week         10/30/2023     4:54 AM   Alcohol Use   Prescreen: >3 drinks/day or >7 drinks/week? No     Reviewed orders with patient.  Reviewed health maintenance and updated orders accordingly - Yes    Breast Cancer Screening:    FHS-7:       8/10/2022     9:09 AM 10/30/2023     4:56 AM   Breast CA Risk Assessment (FHS-7)   Did any of your first-degree relatives have breast or ovarian cancer? Yes Yes   Did any of your relatives have bilateral breast cancer? No No   Did any man in your family have breast cancer? No No   Did any woman in your family have breast and ovarian cancer? Yes Yes   Did any woman in your family have breast cancer before age 50 y? No No   Do you have 2 or more relatives with breast and/or ovarian cancer? No No   Do you have 2 or more relatives with breast and/or bowel cancer?  "No No     Average risk: patient will start screening at the age of 40.       History of abnormal Pap smear: NO - age 30-65 PAP every 5 years with negative HPV co-testing recommended      Latest Ref Rng & Units 12/11/2018    10:24 AM 12/11/2018     7:50 AM 4/1/2015     4:40 PM   PAP / HPV   PAP (Historical)   NIL     HPV 16 DNA NEG^Negative Negative   Negative    HPV 18 DNA NEG^Negative Negative   Negative    Other HR HPV NEG^Negative Negative   Negative      Reviewed and updated as needed this visit by clinical staff   Tobacco  Allergies  Meds              Reviewed and updated as needed this visit by Provider   Tobacco                 Review of Systems   Constitutional:  Negative for chills and fever.   HENT:  Negative for congestion, ear pain, hearing loss and sore throat.    Eyes:  Negative for pain and visual disturbance.   Respiratory:  Negative for cough and shortness of breath.    Cardiovascular:  Negative for chest pain, palpitations and peripheral edema.   Gastrointestinal:  Negative for abdominal pain, constipation, diarrhea, heartburn, hematochezia and nausea.   Breasts:  Negative for tenderness, breast mass and discharge.   Genitourinary:  Negative for dysuria, frequency, genital sores, hematuria, pelvic pain, urgency, vaginal bleeding and vaginal discharge.   Musculoskeletal:  Negative for arthralgias, joint swelling and myalgias.   Skin:  Negative for rash.   Neurological:  Negative for dizziness, weakness, headaches and paresthesias.   Psychiatric/Behavioral:  Negative for mood changes. The patient is not nervous/anxious.       OBJECTIVE:   /78 (BP Location: Left arm, Patient Position: Sitting, Cuff Size: Adult Regular)   Pulse 54   Temp 98.4  F (36.9  C) (Temporal)   Resp 16   Ht 1.68 m (5' 6.15\")   Wt 64.2 kg (141 lb 8 oz)   LMP 10/27/2023 (Exact Date)   SpO2 99%   BMI 22.74 kg/m    Physical Exam  GENERAL: healthy, alert and no distress  EYES: Eyes grossly normal to inspection, " PERRL and conjunctivae and sclerae normal  HENT: ear canals and TM's normal, nose and mouth without ulcers or lesions  NECK: no adenopathy, no asymmetry, masses, or scars and thyroid normal to palpation  RESP: lungs clear to auscultation - no rales, rhonchi or wheezes  BREAST: normal without masses, tenderness or nipple discharge and no palpable axillary masses or adenopathy  CV: regular rate and rhythm, normal S1 S2, no S3 or S4, no murmur, click or rub, no peripheral edema and peripheral pulses strong  ABDOMEN: soft, nontender, no hepatosplenomegaly, no masses and bowel sounds normal  MS: no gross musculoskeletal defects noted, no edema  SKIN: no suspicious lesions or rashes  NEURO: Normal strength and tone, mentation intact and speech normal  PSYCH: mentation appears normal, affect normal/bright    Diagnostic Test Results:  Labs reviewed in Epic    ASSESSMENT/PLAN:   Shayla was seen today for physical.    Diagnoses and all orders for this visit:    Routine general medical examination at a health care facility  -     TSH; Future  -     T3, total; Future  -     T4, free; Future  -     Ferritin; Future  -     CBC with platelets; Future  -     Comprehensive metabolic panel (BMP + Alb, Alk Phos, ALT, AST, Total. Bili, TP); Future    Cervical cancer screening  Not completed. Patient was on her cycle. She will return to clinic for Mirena IUD insertion and pap smear.     Encounter for screening mammogram for malignant neoplasm of breast  -     MA Screening Digital Bilateral; Future    Other orders  -     PRIMARY CARE FOLLOW-UP SCHEDULING; Future  -     REVIEW OF HEALTH MAINTENANCE PROTOCOL ORDERS        Return in about 1 month (around 11/30/2023) for Mirena IUD insertion and pap.     COUNSELING:  Reviewed preventive health counseling, as reflected in patient instructions        She reports that she has never smoked. She has never used smokeless tobacco.        Owen Lane MD  [facility]

## 2023-10-31 NOTE — RESULT ENCOUNTER NOTE
Dear Shayla,   Your TSH and T4 were normal. T3 was mildly decreased. Continue with your current dose and Let's recheck it in 1 month.   Best Regards  Owen Lane MD

## 2023-11-07 ENCOUNTER — MYC REFILL (OUTPATIENT)
Dept: FAMILY MEDICINE | Facility: CLINIC | Age: 38
End: 2023-11-07
Payer: COMMERCIAL

## 2023-11-07 DIAGNOSIS — E06.3 HASHIMOTO'S DISEASE: ICD-10-CM

## 2023-11-07 RX ORDER — LEVOTHYROXINE SODIUM 50 UG/1
50 TABLET ORAL DAILY
Qty: 90 TABLET | Refills: 0 | OUTPATIENT
Start: 2023-11-07

## 2023-11-13 DIAGNOSIS — E06.3 HASHIMOTO'S DISEASE: ICD-10-CM

## 2023-11-13 RX ORDER — LEVOTHYROXINE SODIUM 50 UG/1
50 TABLET ORAL DAILY
Qty: 90 TABLET | Refills: 0 | OUTPATIENT
Start: 2023-11-13

## 2023-11-21 ENCOUNTER — MYC REFILL (OUTPATIENT)
Dept: FAMILY MEDICINE | Facility: CLINIC | Age: 38
End: 2023-11-21
Payer: COMMERCIAL

## 2023-11-21 DIAGNOSIS — E06.3 HASHIMOTO'S DISEASE: ICD-10-CM

## 2023-11-22 RX ORDER — LEVOTHYROXINE SODIUM 50 UG/1
50 TABLET ORAL DAILY
Qty: 90 TABLET | Refills: 0 | Status: SHIPPED | OUTPATIENT
Start: 2023-11-22 | End: 2024-01-06 | Stop reason: DRUGHIGH

## 2023-12-03 ENCOUNTER — E-VISIT (OUTPATIENT)
Dept: URGENT CARE | Facility: CLINIC | Age: 38
End: 2023-12-03
Payer: COMMERCIAL

## 2023-12-03 DIAGNOSIS — R21 RASH AND NONSPECIFIC SKIN ERUPTION: Primary | ICD-10-CM

## 2023-12-03 PROCEDURE — 99207 PR NON-BILLABLE SERV PER CHARTING: CPT | Performed by: EMERGENCY MEDICINE

## 2023-12-04 ENCOUNTER — OFFICE VISIT (OUTPATIENT)
Dept: FAMILY MEDICINE | Facility: CLINIC | Age: 38
End: 2023-12-04
Payer: COMMERCIAL

## 2023-12-04 VITALS
SYSTOLIC BLOOD PRESSURE: 138 MMHG | BODY MASS INDEX: 23 KG/M2 | WEIGHT: 143.1 LBS | DIASTOLIC BLOOD PRESSURE: 88 MMHG | OXYGEN SATURATION: 99 % | HEART RATE: 76 BPM | HEIGHT: 66 IN | TEMPERATURE: 98.1 F | RESPIRATION RATE: 16 BRPM

## 2023-12-04 DIAGNOSIS — L28.2 PRURITIC RASH: Primary | ICD-10-CM

## 2023-12-04 PROCEDURE — 99213 OFFICE O/P EST LOW 20 MIN: CPT | Performed by: NURSE PRACTITIONER

## 2023-12-04 RX ORDER — PREDNISONE 20 MG/1
20 TABLET ORAL DAILY
Qty: 5 TABLET | Refills: 0 | Status: SHIPPED | OUTPATIENT
Start: 2023-12-04 | End: 2023-12-09

## 2023-12-04 NOTE — PATIENT INSTRUCTIONS
Dear Shayla Nolasco,    We are sorry you are not feeling well. Based on the responses you provided, it is recommended that you be seen in-person in urgent care so we can better evaluate your symptoms. Please click here to find the nearest urgent care location to you.   You will not be charged for this Visit. Thank you for trusting us with your care.    Nato Gaxiola MD

## 2023-12-04 NOTE — PROGRESS NOTES
"  Assessment & Plan     (L28.2) Pruritic rash  (primary encounter diagnosis)  Comment: likely contact dermatitis from new serum. Recommended minimal use of topical hydrocortisone. Also sent with prednisone as she has used this in the past and she is wanting the rash to be resolved before her upcoming trip. Encouraged her to hold off on this if she can.   Plan: predniSONE (DELTASONE) 20 MG tablet              REENA Flores CNP  [facility] ARMAAN Mcguire is a 37 year old, presenting for the following health issues:  No chief complaint on file.      History of Present Illness       Reason for visit:  Skin rash  Symptom onset:  3-7 days ago  Symptoms include:  Small clear itchy bumps on face, neck, and torso  Symptom intensity:  Moderate  Symptom progression:  Worsening  Had these symptoms before:  Yes  Has tried/received treatment for these symptoms:  Yes  Previous treatment was successful:  Yes  Prior treatment description:  I think I was prescribed Prednisone last time    She eats 2-3 servings of fruits and vegetables daily.She consumes 1 sweetened beverage(s) daily.She exercises with enough effort to increase her heart rate 30 to 60 minutes per day.  She exercises with enough effort to increase her heart rate 5 days per week.   She is taking medications regularly.     Started rash and itching on Thursday-4 days ago  Thinks it is a new serum. Also has a new lotion  She is historically sensitive to topical products where she has seen derm for this and also has used prednisone in the past   Benadryl helps with the itching.   Using clindamycin for 5-6 months for acne prone skin   Hoping for it to be resolve soon as she is traveling.       Review of Systems   Detailed as above       Objective    /88 (BP Location: Right arm, Patient Position: Sitting, Cuff Size: Adult Regular)   Pulse 76   Temp 98.1  F (36.7  C)   Resp 16   Ht 1.68 m (5' 6.15\")   Wt 64.9 kg (143 lb 1.6 " oz)   LMP 10/27/2023 (Exact Date)   SpO2 99%   BMI 22.99 kg/m    There is no height or weight on file to calculate BMI.  Physical Exam  Constitutional:       Appearance: Normal appearance.   Pulmonary:      Effort: Pulmonary effort is normal.   Skin:     Comments: Multiple tiny clear and slightly erythematous papules on face and neck   Neurological:      Mental Status: She is alert.   Psychiatric:         Mood and Affect: Mood normal.

## 2024-01-03 ENCOUNTER — OFFICE VISIT (OUTPATIENT)
Dept: FAMILY MEDICINE | Facility: CLINIC | Age: 39
End: 2024-01-03
Payer: COMMERCIAL

## 2024-01-03 VITALS
RESPIRATION RATE: 14 BRPM | TEMPERATURE: 97.7 F | OXYGEN SATURATION: 99 % | BODY MASS INDEX: 22.64 KG/M2 | HEART RATE: 57 BPM | SYSTOLIC BLOOD PRESSURE: 132 MMHG | DIASTOLIC BLOOD PRESSURE: 77 MMHG | WEIGHT: 140.9 LBS | HEIGHT: 66 IN

## 2024-01-03 DIAGNOSIS — E06.3 HASHIMOTO'S DISEASE: ICD-10-CM

## 2024-01-03 DIAGNOSIS — Z12.4 CERVICAL CANCER SCREENING: Primary | ICD-10-CM

## 2024-01-03 DIAGNOSIS — Z98.890 HISTORY OF LOOP ELECTRICAL EXCISION PROCEDURE (LEEP): ICD-10-CM

## 2024-01-03 DIAGNOSIS — D06.9 CIN III (CERVICAL INTRAEPITHELIAL NEOPLASIA III): ICD-10-CM

## 2024-01-03 DIAGNOSIS — N88.2 CERVICAL STENOSIS (UTERINE CERVIX): ICD-10-CM

## 2024-01-03 LAB — HCG UR QL: NEGATIVE

## 2024-01-03 PROCEDURE — 84439 ASSAY OF FREE THYROXINE: CPT | Performed by: FAMILY MEDICINE

## 2024-01-03 PROCEDURE — 87624 HPV HI-RISK TYP POOLED RSLT: CPT | Performed by: FAMILY MEDICINE

## 2024-01-03 PROCEDURE — 84443 ASSAY THYROID STIM HORMONE: CPT | Performed by: FAMILY MEDICINE

## 2024-01-03 PROCEDURE — 36415 COLL VENOUS BLD VENIPUNCTURE: CPT | Performed by: FAMILY MEDICINE

## 2024-01-03 PROCEDURE — 99213 OFFICE O/P EST LOW 20 MIN: CPT | Performed by: FAMILY MEDICINE

## 2024-01-03 PROCEDURE — 81025 URINE PREGNANCY TEST: CPT | Performed by: FAMILY MEDICINE

## 2024-01-03 PROCEDURE — G0145 SCR C/V CYTO,THINLAYER,RESCR: HCPCS | Performed by: FAMILY MEDICINE

## 2024-01-03 PROCEDURE — 84481 FREE ASSAY (FT-3): CPT | Performed by: FAMILY MEDICINE

## 2024-01-03 ASSESSMENT — PAIN SCALES - GENERAL: PAINLEVEL: NO PAIN (0)

## 2024-01-03 NOTE — PROGRESS NOTES
"SUBJECTIVE: Shayla Nolasco , 38 year old  is here today for a Pap Smear. Her last Pap was: 2018 and results were: normal cytology and negative HPV. Patient had a LEEP in the past.     Family History is significant for: ovarian cancer and BRCA mutation in mother. Patient tested negative for BRCA.  Personal History is significant for: None and previous surgery/treatments on cervix    ROS:   CONSTITUTIONAL: NEGATIVE for fever, chills  : NEGATIVE for frequency, dysuria, or hematuria.       OBJECTIVE:   /77   Pulse 57   Temp 97.7  F (36.5  C) (Temporal)   Resp 14   Ht 1.676 m (5' 6\")   Wt 63.9 kg (140 lb 14.4 oz)   LMP 12/29/2023 (Exact Date)   SpO2 99%   BMI 22.74 kg/m     EXAM:   Pelvic examination revealed normal external female genitalia. Pap smear obtained today. Narrow Endocervical pipet passed without difficulty. Cervical sounds was not able to pass through the internal cervical os.     ASSESSMENT/ PLAN:    ICD-10-CM    1. Cervical cancer screening  Z12.4 Pap Screen with HPV - recommended age 30 - 65 years      2. QUEENIE III (cervical intraepithelial neoplasia III)  D06.9 Pap Screen with HPV - recommended age 30 - 65 years      3. Hashimoto's disease  E06.3 TSH     T3, total     T3, Free     T4, free     T4, free     T3, Free     T3, total     TSH      4. Cervical stenosis (uterine cervix)  N88.2 HCG Qual, Urine ([id number])     HCG Qual, Urine ([id number])      5. History of loop electrical excision procedure (LEEP)  Z98.890 HCG Qual, Urine ([id number])     HCG Qual, Urine ([id number])        Patient has cervical stenosis. She is not a candidate for contraceptives containing estrogen due to mother's history of BRCA mutation and Ovarian cancer. Risk, benefits and other options were discussed.   One option is attempting IUD insertion with Cytotec. Consider progesterone only contraceptive.    Patient will think about her options and contact us.     MD Ariana      "

## 2024-01-04 LAB
T3 SERPL-MCNC: 82 NG/DL (ref 85–202)
T3FREE SERPL-MCNC: 2.6 PG/ML (ref 2–4.4)
T4 FREE SERPL-MCNC: 1.13 NG/DL (ref 0.9–1.7)
TSH SERPL DL<=0.005 MIU/L-ACNC: 5.99 UIU/ML (ref 0.3–4.2)

## 2024-01-06 DIAGNOSIS — E06.3 HASHIMOTO'S DISEASE: Primary | ICD-10-CM

## 2024-01-06 RX ORDER — LEVOTHYROXINE SODIUM 75 UG/1
75 TABLET ORAL DAILY
Qty: 90 TABLET | Refills: 0 | Status: SHIPPED | OUTPATIENT
Start: 2024-01-06 | End: 2024-03-18

## 2024-01-08 LAB
BKR LAB AP GYN ADEQUACY: NORMAL
BKR LAB AP GYN INTERPRETATION: NORMAL
BKR LAB AP HPV REFLEX: NORMAL
BKR LAB AP PREVIOUS ABNORMAL: NORMAL
PATH REPORT.COMMENTS IMP SPEC: NORMAL
PATH REPORT.COMMENTS IMP SPEC: NORMAL
PATH REPORT.RELEVANT HX SPEC: NORMAL

## 2024-01-09 LAB
HUMAN PAPILLOMA VIRUS 16 DNA: NEGATIVE
HUMAN PAPILLOMA VIRUS 18 DNA: NEGATIVE
HUMAN PAPILLOMA VIRUS FINAL DIAGNOSIS: NORMAL
HUMAN PAPILLOMA VIRUS OTHER HR: NEGATIVE

## 2024-01-19 ENCOUNTER — HOSPITAL ENCOUNTER (OUTPATIENT)
Dept: MAMMOGRAPHY | Facility: CLINIC | Age: 39
Discharge: HOME | End: 2024-01-19
Attending: FAMILY MEDICINE | Admitting: FAMILY MEDICINE
Payer: COMMERCIAL

## 2024-01-19 DIAGNOSIS — Z12.31 ENCOUNTER FOR SCREENING MAMMOGRAM FOR MALIGNANT NEOPLASM OF BREAST: ICD-10-CM

## 2024-01-19 PROCEDURE — 77067 SCR MAMMO BI INCL CAD: CPT

## 2024-01-30 ENCOUNTER — TRANSFERRED RECORDS (OUTPATIENT)
Dept: HEALTH INFORMATION MANAGEMENT | Facility: CLINIC | Age: 39
End: 2024-01-30
Payer: COMMERCIAL

## 2024-03-07 ENCOUNTER — DOCUMENTATION ONLY (OUTPATIENT)
Dept: FAMILY MEDICINE | Facility: CLINIC | Age: 39
End: 2024-03-07
Payer: COMMERCIAL

## 2024-03-07 DIAGNOSIS — E06.3 HASHIMOTO'S DISEASE: Primary | ICD-10-CM

## 2024-03-15 ENCOUNTER — LAB (OUTPATIENT)
Dept: LAB | Facility: CLINIC | Age: 39
End: 2024-03-15
Payer: COMMERCIAL

## 2024-03-15 DIAGNOSIS — Z13.220 SCREENING FOR HYPERLIPIDEMIA: Primary | ICD-10-CM

## 2024-03-15 DIAGNOSIS — E06.3 HASHIMOTO'S DISEASE: ICD-10-CM

## 2024-03-15 PROCEDURE — 84443 ASSAY THYROID STIM HORMONE: CPT

## 2024-03-15 PROCEDURE — 36415 COLL VENOUS BLD VENIPUNCTURE: CPT

## 2024-03-15 PROCEDURE — 80061 LIPID PANEL: CPT

## 2024-03-16 LAB
CHOLEST SERPL-MCNC: 165 MG/DL
FASTING STATUS PATIENT QL REPORTED: NORMAL
HDLC SERPL-MCNC: 77 MG/DL
LDLC SERPL CALC-MCNC: 73 MG/DL
NONHDLC SERPL-MCNC: 88 MG/DL
TRIGL SERPL-MCNC: 74 MG/DL
TSH SERPL DL<=0.005 MIU/L-ACNC: 1.61 UIU/ML (ref 0.3–4.2)

## 2024-03-18 DIAGNOSIS — E06.3 HASHIMOTO'S DISEASE: ICD-10-CM

## 2024-03-18 RX ORDER — LEVOTHYROXINE SODIUM 75 UG/1
75 TABLET ORAL DAILY
Qty: 90 TABLET | Refills: 3 | Status: SHIPPED | OUTPATIENT
Start: 2024-03-18

## 2024-03-18 NOTE — RESULT ENCOUNTER NOTE
Dear Shayla,   Your TSH is excellent. Continue with your current dose of Levothyroxine.     Best Regards   Owen Lane MD

## 2024-07-30 ENCOUNTER — E-VISIT (OUTPATIENT)
Dept: URGENT CARE | Facility: CLINIC | Age: 39
End: 2024-07-30
Payer: COMMERCIAL

## 2024-07-30 DIAGNOSIS — N39.0 ACUTE UTI (URINARY TRACT INFECTION): Primary | ICD-10-CM

## 2024-07-30 PROCEDURE — 99421 OL DIG E/M SVC 5-10 MIN: CPT | Performed by: NURSE PRACTITIONER

## 2024-07-30 RX ORDER — NITROFURANTOIN 25; 75 MG/1; MG/1
100 CAPSULE ORAL 2 TIMES DAILY
Qty: 10 CAPSULE | Refills: 0 | Status: SHIPPED | OUTPATIENT
Start: 2024-07-30 | End: 2024-08-04

## 2024-07-30 NOTE — PATIENT INSTRUCTIONS
Dear Shayla Nolasco    After reviewing your responses, I've been able to diagnose you with a urinary tract infection, which is a common infection of the bladder with bacteria.  This is not a sexually transmitted infection, though urinating immediately after intercourse can help prevent infections.  Drinking lots of fluids is also helpful to clear your current infection and prevent the next one.      I have sent a prescription for antibiotics to your pharmacy to treat this infection.    It is important that you take all of your prescribed medication even if your symptoms are improving after a few doses.  Taking all of your medicine helps prevent the symptoms from returning.     If your symptoms worsen, you develop pain in your back or stomach, develop fevers, or are not improving in 5 days, please contact your primary care provider for an appointment or visit any of our convenient Walk-in or Urgent Care Centers to be seen, which can be found on our website here.    Thanks again for choosing us as your health care partner,    Kyung Madrid CNP  Urinary Tract Infection (UTI) in Women: Care Instructions  Overview     A urinary tract infection (UTI) is an infection caused by bacteria. It can happen anywhere in the urinary tract. A UTI can happen in the:  Kidneys.  Ureters, the tubes that connect the kidneys to the bladder.  Bladder.  Urethra, where the urine comes out.  Most UTIs are bladder infections. They often cause pain or burning when you urinate.  Most UTIs can be cured with antibiotics. If you are prescribed antibiotics, be sure to complete your treatment so that the infection does not get worse.  Follow-up care is a key part of your treatment and safety. Be sure to make and go to all appointments, and call your doctor if you are having problems. It's also a good idea to know your test results and keep a list of the medicines you take.  How can you care for yourself at home?  Take your antibiotics as  "directed. Do not stop taking them just because you feel better. You need to take the full course of antibiotics.  Drink extra water and other fluids for the next day or two. This will help make the urine less concentrated and help wash out the bacteria that are causing the infection. (If you have kidney, heart, or liver disease and have to limit fluids, talk with your doctor before you increase the amount of fluids you drink.)  Avoid drinks that are carbonated or have caffeine. They can irritate the bladder.  Urinate often. Try to empty your bladder each time.  To relieve pain, take a hot bath or lay a heating pad set on low over your lower belly or genital area. Never go to sleep with a heating pad in place.  To prevent UTIs  Drink plenty of water each day. This helps you urinate often, which clears bacteria from your system. (If you have kidney, heart, or liver disease and have to limit fluids, talk with your doctor before you increase the amount of fluids you drink.)  Urinate when you need to.  If you are sexually active, urinate right after you have sex.  Change sanitary pads often.  Avoid douches, bubble baths, feminine hygiene sprays, and other feminine hygiene products that have deodorants.  After going to the bathroom, wipe from front to back.  When should you call for help?   Call your doctor now or seek immediate medical care if:    You have new or worse fever, chills, nausea, or vomiting.     You have new pain in your back just below your rib cage. This is called flank pain.     There is new blood or pus in your urine.     You have any problems with your antibiotic medicine.   Watch closely for changes in your health, and be sure to contact your doctor if:    You are not getting better after taking an antibiotic for 2 days.     Your symptoms go away but then come back.   Where can you learn more?  Go to https://www.healthwise.net/patiented  Enter K848 in the search box to learn more about \"Urinary Tract " "Infection (UTI) in Women: Care Instructions.\"  Current as of: November 15, 2023               Content Version: 14.0    4138-0088 [employer].   Care instructions adapted under license by your healthcare professional. If you have questions about a medical condition or this instruction, always ask your healthcare professional. [employer] disclaims any warranty or liability for your use of this information.      "

## 2024-10-27 SDOH — HEALTH STABILITY: PHYSICAL HEALTH: ON AVERAGE, HOW MANY DAYS PER WEEK DO YOU ENGAGE IN MODERATE TO STRENUOUS EXERCISE (LIKE A BRISK WALK)?: 6 DAYS

## 2024-10-27 SDOH — HEALTH STABILITY: PHYSICAL HEALTH: ON AVERAGE, HOW MANY MINUTES DO YOU ENGAGE IN EXERCISE AT THIS LEVEL?: 30 MIN

## 2024-10-27 ASSESSMENT — SOCIAL DETERMINANTS OF HEALTH (SDOH): HOW OFTEN DO YOU GET TOGETHER WITH FRIENDS OR RELATIVES?: ONCE A WEEK

## 2024-10-30 ENCOUNTER — OFFICE VISIT (OUTPATIENT)
Dept: FAMILY MEDICINE | Facility: CLINIC | Age: 39
End: 2024-10-30
Attending: FAMILY MEDICINE
Payer: COMMERCIAL

## 2024-10-30 VITALS
DIASTOLIC BLOOD PRESSURE: 88 MMHG | RESPIRATION RATE: 16 BRPM | HEIGHT: 66 IN | HEART RATE: 56 BPM | OXYGEN SATURATION: 98 % | BODY MASS INDEX: 22.18 KG/M2 | WEIGHT: 138 LBS | TEMPERATURE: 97.5 F | SYSTOLIC BLOOD PRESSURE: 132 MMHG

## 2024-10-30 DIAGNOSIS — F41.9 ANXIETY: ICD-10-CM

## 2024-10-30 DIAGNOSIS — Z00.00 ENCOUNTER FOR PREVENTATIVE ADULT HEALTH CARE EXAMINATION: Primary | ICD-10-CM

## 2024-10-30 DIAGNOSIS — N94.6 DYSMENORRHEA: ICD-10-CM

## 2024-10-30 DIAGNOSIS — E06.3 HASHIMOTO'S DISEASE: ICD-10-CM

## 2024-10-30 DIAGNOSIS — N88.2 CERVICAL STENOSIS (UTERINE CERVIX): ICD-10-CM

## 2024-10-30 LAB
ERYTHROCYTE [DISTWIDTH] IN BLOOD BY AUTOMATED COUNT: 11.7 % (ref 10–15)
HCT VFR BLD AUTO: 42.4 % (ref 35–47)
HGB BLD-MCNC: 14.4 G/DL (ref 11.7–15.7)
MCH RBC QN AUTO: 29.6 PG (ref 26.5–33)
MCHC RBC AUTO-ENTMCNC: 34 G/DL (ref 31.5–36.5)
MCV RBC AUTO: 87 FL (ref 78–100)
PLATELET # BLD AUTO: 399 10E3/UL (ref 150–450)
RBC # BLD AUTO: 4.87 10E6/UL (ref 3.8–5.2)
WBC # BLD AUTO: 7.8 10E3/UL (ref 4–11)

## 2024-10-30 PROCEDURE — 90480 ADMN SARSCOV2 VAC 1/ONLY CMP: CPT | Performed by: FAMILY MEDICINE

## 2024-10-30 PROCEDURE — 84439 ASSAY OF FREE THYROXINE: CPT | Performed by: FAMILY MEDICINE

## 2024-10-30 PROCEDURE — 80053 COMPREHEN METABOLIC PANEL: CPT | Performed by: FAMILY MEDICINE

## 2024-10-30 PROCEDURE — 91320 SARSCV2 VAC 30MCG TRS-SUC IM: CPT | Performed by: FAMILY MEDICINE

## 2024-10-30 PROCEDURE — 84443 ASSAY THYROID STIM HORMONE: CPT | Performed by: FAMILY MEDICINE

## 2024-10-30 PROCEDURE — 99396 PREV VISIT EST AGE 40-64: CPT | Performed by: FAMILY MEDICINE

## 2024-10-30 PROCEDURE — 85027 COMPLETE CBC AUTOMATED: CPT | Performed by: FAMILY MEDICINE

## 2024-10-30 PROCEDURE — 36415 COLL VENOUS BLD VENIPUNCTURE: CPT | Performed by: FAMILY MEDICINE

## 2024-10-30 RX ORDER — HYDROXYZINE HYDROCHLORIDE 25 MG/1
25 TABLET, FILM COATED ORAL 3 TIMES DAILY PRN
Qty: 90 TABLET | Refills: 1 | Status: SHIPPED | OUTPATIENT
Start: 2024-10-30

## 2024-10-30 ASSESSMENT — ANXIETY QUESTIONNAIRES
GAD7 TOTAL SCORE: 11
1. FEELING NERVOUS, ANXIOUS, OR ON EDGE: MORE THAN HALF THE DAYS
IF YOU CHECKED OFF ANY PROBLEMS ON THIS QUESTIONNAIRE, HOW DIFFICULT HAVE THESE PROBLEMS MADE IT FOR YOU TO DO YOUR WORK, TAKE CARE OF THINGS AT HOME, OR GET ALONG WITH OTHER PEOPLE: SOMEWHAT DIFFICULT
3. WORRYING TOO MUCH ABOUT DIFFERENT THINGS: MORE THAN HALF THE DAYS
2. NOT BEING ABLE TO STOP OR CONTROL WORRYING: MORE THAN HALF THE DAYS
7. FEELING AFRAID AS IF SOMETHING AWFUL MIGHT HAPPEN: NOT AT ALL
GAD7 TOTAL SCORE: 11
4. TROUBLE RELAXING: NEARLY EVERY DAY
8. IF YOU CHECKED OFF ANY PROBLEMS, HOW DIFFICULT HAVE THESE MADE IT FOR YOU TO DO YOUR WORK, TAKE CARE OF THINGS AT HOME, OR GET ALONG WITH OTHER PEOPLE?: SOMEWHAT DIFFICULT
5. BEING SO RESTLESS THAT IT IS HARD TO SIT STILL: SEVERAL DAYS
7. FEELING AFRAID AS IF SOMETHING AWFUL MIGHT HAPPEN: NOT AT ALL
6. BECOMING EASILY ANNOYED OR IRRITABLE: SEVERAL DAYS
GAD7 TOTAL SCORE: 11

## 2024-10-30 ASSESSMENT — PAIN SCALES - GENERAL: PAINLEVEL_OUTOF10: NO PAIN (0)

## 2024-10-30 ASSESSMENT — PATIENT HEALTH QUESTIONNAIRE - PHQ9
SUM OF ALL RESPONSES TO PHQ QUESTIONS 1-9: 6
10. IF YOU CHECKED OFF ANY PROBLEMS, HOW DIFFICULT HAVE THESE PROBLEMS MADE IT FOR YOU TO DO YOUR WORK, TAKE CARE OF THINGS AT HOME, OR GET ALONG WITH OTHER PEOPLE: SOMEWHAT DIFFICULT
SUM OF ALL RESPONSES TO PHQ QUESTIONS 1-9: 6

## 2024-10-30 NOTE — PROGRESS NOTES
Preventive Care Visit  [facility]  Owen Lane MD, Family Medicine  Oct 30, 2024      Assessment & Plan     Shayla was seen today for physical.    Diagnoses and all orders for this visit:    Encounter for preventative adult health care examination  -     CBC with platelets; Future  -     Comprehensive metabolic panel (BMP + Alb, Alk Phos, ALT, AST, Total. Bili, TP); Future    Dysmenorrhea  -     Ob/Gyn  Referral; Future    Cervical stenosis (uterine cervix)  -     Ob/Gyn  Referral; Future    Hashimoto's disease  -     TSH with free T4 reflex; Future  -     T4 free    Anxiety  -     hydrOXYzine HCl (ATARAX) 25 MG tablet; Take 1 tablet (25 mg) by mouth 3 times daily as needed for anxiety.    Other orders  -     PRIMARY CARE FOLLOW-UP SCHEDULING  -     COVID-19 12+ (PFIZER)  -     REVIEW OF HEALTH MAINTENANCE PROTOCOL ORDERS  -     PRIMARY CARE FOLLOW-UP SCHEDULING; Future      Follow-up:     Follow-up Visit   Expected date:  Oct 30, 2025 (Approximate)      Follow Up Appointment Details:     Follow-up with whom?: PCP    Follow-Up for what?: Adult Preventive    How?: In Person              Counseling  Appropriate preventive services were addressed with this patient via screening, questionnaire, or discussion as appropriate for fall prevention, nutrition, physical activity, Tobacco-use cessation, social engagement, weight loss and cognition.  Checklist reviewing preventive services available has been given to the patient.  The patient's PHQ-9 score is consistent with mild depression. She was provided with information regarding depression.     Subjective   Shayla is a 38 year old, presenting for the following:  Physical        10/30/2024     4:25 PM   Additional Questions   Roomed by SHAKIRA Mckeon   Accompanied by N/A          HPI  Started a new job in August, anxiety is getting worse. Tried therapy rpreviously and it did work well.     Health Care Directive  Patient does not have a  Health Care Directive: Discussed advance care planning with patient; information given to patient to review.      10/27/2024   General Health   How would you rate your overall physical health? Excellent   Feel stress (tense, anxious, or unable to sleep) Very much      (!) STRESS CONCERN      10/27/2024   Nutrition   Three or more servings of calcium each day? Yes   Diet: Regular (no restrictions)   How many servings of fruit and vegetables per day? (!) 2-3   How many sweetened beverages each day? 0-1            10/27/2024   Exercise   Days per week of moderate/strenous exercise 6 days   Average minutes spent exercising at this level 30 min            10/27/2024   Social Factors   Frequency of gathering with friends or relatives Once a week   Worry food won't last until get money to buy more No    No   Food not last or not have enough money for food? No    No   Do you have housing? (Housing is defined as stable permanent housing and does not include staying ouside in a car, in a tent, in an abandoned building, in an overnight shelter, or couch-surfing.) Yes    Yes   Are you worried about losing your housing? No    No   Lack of transportation? No    No   Unable to get utilities (heat,electricity)? No    No       Multiple values from one day are sorted in reverse-chronological order         10/27/2024   Dental   Dentist two times every year? Yes            10/27/2024   TB Screening   Were you born outside of the [place]? No          Today's PHQ-9 Score:       10/30/2024     4:09 PM   PHQ-9 SCORE   PHQ-9 Total Score MyChart 6 (Mild depression)   PHQ-9 Total Score 6        Patient-reported         10/27/2024   Substance Use   Alcohol more than 3/day or more than 7/wk No   Do you use any other substances recreationally? No        Social History     Tobacco Use    Smoking status: Never    Smokeless tobacco: Never   Vaping Use    Vaping status: Never Used   Substance Use Topics    Alcohol use: Yes     Alcohol/week: 3.0 standard  drinks of alcohol     Types: 3 Standard drinks or equivalent per week     Comment: 1-2 drinks 1-3 times per week    Drug use: Not Currently           1/19/2024   LAST FHS-7 RESULTS   1st degree relative breast or ovarian cancer No   Any relative bilateral breast cancer No   Any male have breast cancer No   Any ONE woman have BOTH breast AND ovarian cancer No   Any woman with breast cancer before 50yrs No   2 or more relatives with breast AND/OR ovarian cancer No   2 or more relatives with breast AND/OR bowel cancer No           Mammogram Screening - Mammogram every 1-2 years updated in Health Maintenance based on mutual decision making        10/27/2024   STI Screening   New sexual partner(s) since last STI/HIV test? No        History of abnormal Pap smear: No - age 30- 64 PAP with HPV every 5 years recommended        Latest Ref Rng & Units 1/3/2024     4:35 PM 12/11/2018    10:24 AM 12/11/2018     7:50 AM   PAP / HPV   PAP  Negative for Intraepithelial Lesion or Malignancy (NILM)      PAP (Historical)    NIL    HPV 16 DNA Negative Negative  Negative     HPV 18 DNA Negative Negative  Negative     Other HR HPV Negative Negative  Negative       ASCVD Risk   The 10-year ASCVD risk score (Denilson MILAN, et al., 2019) is: 0.2%    Values used to calculate the score:      Age: 38 years      Sex: Female      Is Non- : No      Diabetic: No      Tobacco smoker: No      Systolic Blood Pressure: 132 mmHg      Is BP treated: No      HDL Cholesterol: 77 mg/dL      Total Cholesterol: 165 mg/dL        10/27/2024   Contraception/Family Planning   Questions about contraception or family planning (!) YES          Reviewed and updated as needed this visit by Provider   Tobacco                    Review of Systems  Constitutional, neuro, ENT, endocrine, pulmonary, cardiac, gastrointestinal, genitourinary, musculoskeletal, integument and psychiatric systems are negative, except as otherwise noted.    "  Objective    Exam  /88 (BP Location: Left arm, Patient Position: Sitting, Cuff Size: Adult Regular)   Pulse 56   Temp 97.5  F (36.4  C) (Temporal)   Resp 16   Ht 1.674 m (5' 5.9\")   Wt 62.6 kg (138 lb)   LMP 10/27/2024 (Exact Date)   SpO2 98%   BMI 22.34 kg/m     Estimated body mass index is 22.34 kg/m  as calculated from the following:    Height as of this encounter: 1.674 m (5' 5.9\").    Weight as of this encounter: 62.6 kg (138 lb).    Physical Exam  GENERAL: alert and no distress  EYES: Eyes grossly normal to inspection, PERRL and conjunctivae and sclerae normal  HENT: ear canals and TM's normal, nose and mouth without ulcers or lesions  NECK: no adenopathy, no asymmetry, masses, or scars  RESP: lungs clear to auscultation - no rales, rhonchi or wheezes  CV: regular rate and rhythm, normal S1 S2, no S3 or S4, no murmur, click or rub, no peripheral edema  ABDOMEN: soft, nontender, no hepatosplenomegaly, no masses and bowel sounds normal  MS: no gross musculoskeletal defects noted, no edema  SKIN: no suspicious lesions or rashes  NEURO: Normal strength and tone, mentation intact and speech normal  PSYCH: mentation appears normal, affect normal/bright        Signed Electronically by: Owen Lane MD    "

## 2024-10-30 NOTE — PATIENT INSTRUCTIONS
Patient Education   Preventive Care Advice   This is general advice given by our system to help you stay healthy. However, your care team may have specific advice just for you. Please talk to your care team about your preventive care needs.  Nutrition  Eat 5 or more servings of fruits and vegetables each day.  Try wheat bread, brown rice and whole grain pasta (instead of white bread, rice, and pasta).  Get enough calcium and vitamin D. Check the label on foods and aim for 100% of the RDA (recommended daily allowance).  Lifestyle  Exercise at least 150 minutes each week  (30 minutes a day, 5 days a week).  Do muscle strengthening activities 2 days a week. These help control your weight and prevent disease.  No smoking.  Wear sunscreen to prevent skin cancer.  Have a dental exam and cleaning every 6 months.  Yearly exams  See your health care team every year to talk about:  Any changes in your health.  Any medicines your care team has prescribed.  Preventive care, family planning, and ways to prevent chronic diseases.  Shots (vaccines)   HPV shots (up to age 26), if you've never had them before.  Hepatitis B shots (up to age 59), if you've never had them before.  COVID-19 shot: Get this shot when it's due.  Flu shot: Get a flu shot every year.  Tetanus shot: Get a tetanus shot every 10 years.  Pneumococcal, hepatitis A, and RSV shots: Ask your care team if you need these based on your risk.  Shingles shot (for age 50 and up)  General health tests  Diabetes screening:  Starting at age 35, Get screened for diabetes at least every 3 years.  If you are younger than age 35, ask your care team if you should be screened for diabetes.  Cholesterol test: At age 39, start having a cholesterol test every 5 years, or more often if advised.  Bone density scan (DEXA): At age 50, ask your care team if you should have this scan for osteoporosis (brittle bones).  Hepatitis C: Get tested at least once in your life.  STIs (sexually  transmitted infections)  Before age 24: Ask your care team if you should be screened for STIs.  After age 24: Get screened for STIs if you're at risk. You are at risk for STIs (including HIV) if:  You are sexually active with more than one person.  You don't use condoms every time.  You or a partner was diagnosed with a sexually transmitted infection.  If you are at risk for HIV, ask about PrEP medicine to prevent HIV.  Get tested for HIV at least once in your life, whether you are at risk for HIV or not.  Cancer screening tests  Cervical cancer screening: If you have a cervix, begin getting regular cervical cancer screening tests starting at age 21.  Breast cancer scan (mammogram): If you've ever had breasts, begin having regular mammograms starting at age 40. This is a scan to check for breast cancer.  Colon cancer screening: It is important to start screening for colon cancer at age 45.  Have a colonoscopy test every 10 years (or more often if you're at risk) Or, ask your provider about stool tests like a FIT test every year or Cologuard test every 3 years.  To learn more about your testing options, visit:   .  For help making a decision, visit:   https://bit.ly/nu44832.  Prostate cancer screening test: If you have a prostate, ask your care team if a prostate cancer screening test (PSA) at age 55 is right for you.  Lung cancer screening: If you are a current or former smoker ages 50 to 80, ask your care team if ongoing lung cancer screenings are right for you.  For informational purposes only. Not to replace the advice of your health care provider. Copyright   2023 [facility]. All rights reserved. Clinically reviewed by the [facility]. [employer] [id number] - REV 01/24.  Learning About Stress  What is stress?     Stress is your body's response to a hard situation. Your body can have a physical, emotional, or mental response. Stress is a fact of life for most people, and it  affects everyone differently. What causes stress for you may not be stressful for someone else.  A lot of things can cause stress. You may feel stress when you go on a job interview, take a test, or run a race. This kind of short-term stress is normal and even useful. It can help you if you need to work hard or react quickly. For example, stress can help you finish an important job on time.  Long-term stress is caused by ongoing stressful situations or events. Examples of long-term stress include long-term health problems, ongoing problems at work, or conflicts in your family. Long-term stress can harm your health.  How does stress affect your health?  When you are stressed, your body responds as though you are in danger. It makes hormones that speed up your heart, make you breathe faster, and give you a burst of energy. This is called the fight-or-flight stress response. If the stress is over quickly, your body goes back to normal and no harm is done.  But if stress happens too often or lasts too long, it can have bad effects. Long-term stress can make you more likely to get sick, and it can make symptoms of some diseases worse. If you tense up when you are stressed, you may develop neck, shoulder, or low back pain. Stress is linked to high blood pressure and heart disease.  Stress also harms your emotional health. It can make you mata, tense, or depressed. Your relationships may suffer, and you may not do well at work or school.  What can you do to manage stress?  You can try these things to help manage stress:   Do something active. Exercise or activity can help reduce stress. Walking is a great way to get started. Even everyday activities such as housecleaning or yard work can help.  Try yoga or carmen chi. These techniques combine exercise and meditation. You may need some training at first to learn them.  Do something you enjoy. For example, listen to music or go to a movie. Practice your hobby or do volunteer  "work.  Meditate. This can help you relax, because you are not worrying about what happened before or what may happen in the future.  Do guided imagery. Imagine yourself in any setting that helps you feel calm. You can use online videos, books, or a teacher to guide you.  Do breathing exercises. For example:  From a standing position, bend forward from the waist with your knees slightly bent. Let your arms dangle close to the floor.  Breathe in slowly and deeply as you return to a standing position. Roll up slowly and lift your head last.  Hold your breath for just a few seconds in the standing position.  Breathe out slowly and bend forward from the waist.  Let your feelings out. Talk, laugh, cry, and express anger when you need to. Talking with supportive friends or family, a counselor, or a jeanette leader about your feelings is a healthy way to relieve stress. Avoid discussing your feelings with people who make you feel worse.  Write. It may help to write about things that are bothering you. This helps you find out how much stress you feel and what is causing it. When you know this, you can find better ways to cope.  What can you do to prevent stress?  You might try some of these things to help prevent stress:  Manage your time. This helps you find time to do the things you want and need to do.  Get enough sleep. Your body recovers from the stresses of the day while you are sleeping.  Get support. Your family, friends, and community can make a difference in how you experience stress.  Limit your news feed. Avoid or limit time on social media or news that may make you feel stressed.  Do something active. Exercise or activity can help reduce stress. Walking is a great way to get started.  Where can you learn more?  Go to https://www.[employer].net/patiented  Enter N032 in the search box to learn more about \"Learning About Stress.\"  Current as of: October 24, 2023  Content Version: 14.2 2024 [employer]. "   Care instructions adapted under license by your healthcare professional. If you have questions about a medical condition or this instruction, always ask your healthcare professional. Healthwise, [facility] disclaims any warranty or liability for your use of this information.    Learning About Depression Screening  What is depression screening?  Depression screening is a way to see if you have depression symptoms. It may be done by a doctor or counselor. It's often part of a routine checkup. That's because your mental health is just as important as your physical health.  Depression is a mental health condition that affects how you feel, think, and act. You may:  Have less energy.  Lose interest in your daily activities.  Feel sad and grouchy for a long time.  Depression is very common. It affects people of all ages.  Many things can lead to depression. Some people become depressed after they have a stroke or find out they have a major illness like cancer or heart disease. The death of a loved one or a breakup may lead to depression. It can run in families. Most experts believe that a combination of inherited genes and stressful life events can cause it.  What happens during screening?  You may be asked to fill out a form about your depression symptoms. You and the doctor will discuss your answers. The doctor may ask you more questions to learn more about how you think, act, and feel.  What happens after screening?  If you have symptoms of depression, your doctor will talk to you about your options.  Doctors usually treat depression with medicines or counseling. Often, combining the two works best. Many people don't get help because they think that they'll get over the depression on their own. But people with depression may not get better unless they get treatment.  The cause of depression is not well understood. There may be many factors involved. But if you have depression, it's not your fault.  A serious  "symptom of depression is thinking about death or suicide. If you or someone you care about talks about this or about feeling hopeless, get help right away.  It's important to know that depression can be treated. Medicine, counseling, and self-care may help.  Where can you learn more?  Go to https://www.[employer].net/patiented  Enter T185 in the search box to learn more about \"Learning About Depression Screening.\"  Current as of: June 24, 2023  Content Version: 14.2 2024 [facility].   Care instructions adapted under license by your healthcare professional. If you have questions about a medical condition or this instruction, always ask your healthcare professional. [employer] disclaims any warranty or liability for your use of this information.       "

## 2024-10-30 NOTE — NURSING NOTE
Pt received COVID vaccine per Dr. Lane's orders. Pt given VIS forms prior to immunization administration.   Prior to immunization administration, verified patients identity using patient s name and date of birth.     Patient instructed to remain in clinic for 15 minutes afterwards, and to report any adverse reactions.     Performed by Mike Talavera RN on 10/30/2024.

## 2024-11-01 LAB
ALBUMIN SERPL BCG-MCNC: 4.7 G/DL (ref 3.5–5.2)
ALP SERPL-CCNC: 70 U/L (ref 40–150)
ALT SERPL W P-5'-P-CCNC: 18 U/L (ref 0–50)
ANION GAP SERPL CALCULATED.3IONS-SCNC: 16 MMOL/L (ref 7–15)
AST SERPL W P-5'-P-CCNC: 20 U/L (ref 0–45)
BILIRUB SERPL-MCNC: 0.3 MG/DL
BUN SERPL-MCNC: 18.6 MG/DL (ref 6–20)
CALCIUM SERPL-MCNC: 9.6 MG/DL (ref 8.8–10.4)
CHLORIDE SERPL-SCNC: 101 MMOL/L (ref 98–107)
CREAT SERPL-MCNC: 1.04 MG/DL (ref 0.51–0.95)
EGFRCR SERPLBLD CKD-EPI 2021: 69 ML/MIN/1.73M2
GLUCOSE SERPL-MCNC: 87 MG/DL (ref 70–99)
HCO3 SERPL-SCNC: 22 MMOL/L (ref 22–29)
POTASSIUM SERPL-SCNC: 4.5 MMOL/L (ref 3.4–5.3)
PROT SERPL-MCNC: 7.1 G/DL (ref 6.4–8.3)
SODIUM SERPL-SCNC: 139 MMOL/L (ref 135–145)
T4 FREE SERPL-MCNC: 1.32 NG/DL (ref 0.9–1.7)
TSH SERPL DL<=0.005 MIU/L-ACNC: 5.32 UIU/ML (ref 0.3–4.2)

## 2024-11-11 DIAGNOSIS — E06.3 HASHIMOTO'S DISEASE: Primary | ICD-10-CM

## 2024-11-12 NOTE — RESULT ENCOUNTER NOTE
Message sent to patient   Dear Shayla  Here are my comments regarding your labs:    Trivial increase in your creatinine could be related to ibuprofen or just mild dehydration. Increase your oral hydration and avoid over the counter NSAIDS (ibuprofen and Naproxen).    Let's keep your levothyroxine dose the same. I placed a new order in your chart to re-check your TSH. Please schedule a lab only visit via [employer].     Sincerely   MD Ariana

## 2024-11-23 DIAGNOSIS — F41.9 ANXIETY: ICD-10-CM

## 2024-11-25 RX ORDER — HYDROXYZINE HYDROCHLORIDE 25 MG/1
25 TABLET, FILM COATED ORAL 3 TIMES DAILY PRN
Qty: 270 TABLET | Refills: 1 | Status: SHIPPED | OUTPATIENT
Start: 2024-11-25

## 2024-12-12 ENCOUNTER — OFFICE VISIT (OUTPATIENT)
Dept: OBGYN | Facility: CLINIC | Age: 39
End: 2024-12-12
Attending: FAMILY MEDICINE
Payer: COMMERCIAL

## 2024-12-12 VITALS — WEIGHT: 142 LBS | DIASTOLIC BLOOD PRESSURE: 90 MMHG | SYSTOLIC BLOOD PRESSURE: 140 MMHG | BODY MASS INDEX: 22.99 KG/M2

## 2024-12-12 DIAGNOSIS — Z30.09 COUNSELING FOR BIRTH CONTROL REGARDING INTRAUTERINE DEVICE (IUD): ICD-10-CM

## 2024-12-12 DIAGNOSIS — N94.6 DYSMENORRHEA: Primary | ICD-10-CM

## 2024-12-12 DIAGNOSIS — N88.2 CERVICAL STENOSIS (UTERINE CERVIX): ICD-10-CM

## 2024-12-12 RX ORDER — MISOPROSTOL 200 UG/1
600 TABLET ORAL ONCE
Qty: 3 TABLET | Refills: 0 | Status: SHIPPED | OUTPATIENT
Start: 2024-12-12 | End: 2024-12-12

## 2024-12-12 ASSESSMENT — ANXIETY QUESTIONNAIRES
GAD7 TOTAL SCORE: 3
5. BEING SO RESTLESS THAT IT IS HARD TO SIT STILL: NOT AT ALL
IF YOU CHECKED OFF ANY PROBLEMS ON THIS QUESTIONNAIRE, HOW DIFFICULT HAVE THESE PROBLEMS MADE IT FOR YOU TO DO YOUR WORK, TAKE CARE OF THINGS AT HOME, OR GET ALONG WITH OTHER PEOPLE: NOT DIFFICULT AT ALL
1. FEELING NERVOUS, ANXIOUS, OR ON EDGE: SEVERAL DAYS
8. IF YOU CHECKED OFF ANY PROBLEMS, HOW DIFFICULT HAVE THESE MADE IT FOR YOU TO DO YOUR WORK, TAKE CARE OF THINGS AT HOME, OR GET ALONG WITH OTHER PEOPLE?: NOT DIFFICULT AT ALL
3. WORRYING TOO MUCH ABOUT DIFFERENT THINGS: SEVERAL DAYS
6. BECOMING EASILY ANNOYED OR IRRITABLE: NOT AT ALL
GAD7 TOTAL SCORE: 3
2. NOT BEING ABLE TO STOP OR CONTROL WORRYING: NOT AT ALL
GAD7 TOTAL SCORE: 3
4. TROUBLE RELAXING: SEVERAL DAYS
7. FEELING AFRAID AS IF SOMETHING AWFUL MIGHT HAPPEN: NOT AT ALL
7. FEELING AFRAID AS IF SOMETHING AWFUL MIGHT HAPPEN: NOT AT ALL

## 2024-12-12 NOTE — PROGRESS NOTES
Answers submitted by the patient for this visit:  New Patient on 12/12/2024  9:30 AM with Kriss Mendoza  Patient Health Questionnaire (Submitted on 12/12/2024)  If you checked off any problems, how difficult have these problems made it for you to do your work, take care of things at home, or get along with other people?: Not difficult at all  PHQ9 TOTAL SCORE: 3  Patient Health Questionnaire (G7) (Submitted on 12/12/2024)  CATE 7 TOTAL SCORE: 3    SUBJECTIVE:                                                   Shayla Nolasco is a 38 year old female who presents to clinic today for the following health issue(s):  Patient presents with:  Consult: Discuss Hysterectomy     HPI:  Here to discuss dysemenorrhea.   Notes she has a long-standing history of dysmenorrhea that is significantly disruptive to quality of life. She needs to take frequent ibuprofen every 4-5 hours to control her pain and sometimes needs to call into work sick. This has been ongoing since stopping OCPs at 31. She is unsure if she wants to consider IUD attempt or definitive hysterectomy.  She saw another provider for these concerns in the past and was advised to start with an IUD. IUD attempt was made with per primary provider but unsuccessful due to scar tissue on the cervix. Patient has a history of a LEEP procedure. She is up to date on her pap smear screening.    Had a LEEP (10/2007) then at at first visit with us has had one NIL pap in 2/08  1/3/24 NIL pap, Neg HPV. Plan 3 yr co-testing d/t history of LEEP  Does have a family history of ovarian cancer in her mother (+BRCA testing). Patient reports she had negative genetic testing herself. Was advised to avoid estrogen contraception.  She does not have any abnormal bleeding outside of periods. No pain with intercourse. No vaginal dryness.   G0 with no plans for future fertility.     PMH: Hashimoto's thyroiditis  PSH: Denies abdominal surgery    Patient's last menstrual period was 11/19/2024  (exact date)..     Patient is sexually active, .  Using vasectomy for contraception.    reports that she has never smoked. She has never used smokeless tobacco.    STD testing offered?  Declined    Health maintenance updated:  yes    Today's PHQ-2 Score:       1/3/2024     3:27 PM   PHQ-2 (  [employer])   Q1: Little interest or pleasure in doing things 0    Q2: Feeling down, depressed or hopeless 0    PHQ-2 Score 0   Q1: Little interest or pleasure in doing things Not at all   Q2: Feeling down, depressed or hopeless Not at all   PHQ-2 Score 0       Patient-reported     Today's PHQ-9 Score:       2024     9:20 AM   PHQ-9 SCORE   PHQ-9 Total Score MyChart 3 (Minimal depression)   PHQ-9 Total Score 3        Patient-reported     Today's CATE-7 Score:       2024     9:21 AM   CATE-7 SCORE   Total Score 3 (minimal anxiety)   Total Score 3        Patient-reported       Problem list and histories reviewed & adjusted, as indicated.  Additional history: as documented.    Patient Active Problem List   Diagnosis    QUEENIE III (cervical intraepithelial neoplasia III)    Family history of ovarian cancer    Family history of BRCA gene positive    Mixed anxiety depressive disorder    Hashimoto's disease    Gastroesophageal reflux disease without esophagitis    History of loop electrical excision procedure (LEEP)     Past Surgical History:   Procedure Laterality Date    CL AFF SURGICAL PATHOLOGY  10/01/2007    LEEP    CL AFF SURGICAL PATHOLOGY  2007    ESOPHAGOSCOPY, GASTROSCOPY, DUODENOSCOPY (EGD), COMBINED N/A 10/19/2022    Procedure: ESOPHAGOGASTRODUODENOSCOPY, WITH BIOPSY;  Surgeon: David Bridges MD;  Location:  GI    GYN SURGERY  2007    LEEP Procedure      Social History     Tobacco Use    Smoking status: Never    Smokeless tobacco: Never   Substance Use Topics    Alcohol use: Yes     Alcohol/week: 3.0 standard drinks of alcohol     Types: 3 Standard drinks or equivalent per week     Comment:  1-2 drinks 1-3 times per week      Problem (# of Occurrences) Relation (Name,Age of Onset)    Asthma (2) Maternal Grandmother (Margo Goldstein), Paternal Grandmother (Timo Goldstein)    Cancer (1) Mother (Daphnie Nolasco, 51): Ovarian Cancer (3 times)    Circulatory (1) Maternal Grandfather (Timo Goldstein): blood clots    Diabetes (3) Mother (Daphnie Nolasco): Type I, Maternal Grandmother (Margo Goldstein): Type II, Father (Yobany Nolasco): Type II    Genetic Disorder (1) Mother (Daphnie Nolasco): BRCA2    Gastrointestinal Disease (1) Father (Yobany Nolasco): stomach ulcers    Hypertension (2) Maternal Grandmother (Margo Goldstein), Maternal Grandfather (Timo Goldstein)    Lipids (1) Maternal Grandfather (Timo Goldstein): high cholesterol    Neurologic Disorder (1) Father (Yobany Nolasco): migraines    Thyroid Disease (1) Mother (Daphnie Nolasco)    Cancer - colorectal (1) Paternal Grandfather (Kushal Nolasco): age 70's (had stomach Ca as a younger man)    Prostate Cancer (1) Paternal Grandfather (Kushal Nolasco)    C.A.D. (1) Maternal Grandfather (Timo Goldstein): MI    Other Cancer (2) Mother (Daphnie Nolasco): ovarian, Paternal Grandfather (Kushal Nolasco): stomach, non-hodgkins lymphoma, esophageal    Hyperlipidemia (1) Maternal Grandfather (Timo Goldstein)    Coronary Artery Disease (1) Maternal Grandfather (Timo Goldstein)           Negative family history of: Skin Cancer              Current Outpatient Medications   Medication Sig Dispense Refill    Cholecalciferol (VITAMIN D3) 125 MCG (5000 UT) CHEW       fish oil-omega-3 fatty acids 1000 MG capsule Take 2 g by mouth daily      hydrOXYzine HCl (ATARAX) 25 MG tablet TAKE 1 TABLET BY MOUTH 3 TIMES DAILY AS NEEDED FOR ANXIETY. 270 tablet 1    levothyroxine (SYNTHROID/LEVOTHROID) 75 MCG tablet Take 1 tablet (75 mcg) by mouth daily 90 tablet 3    Multiple Vitamins-Minerals (MULTIVITAMIN OR)        No current facility-administered medications for this visit.     Allergies   Allergen Reactions    Sulfa Antibiotics Hives     Formaldehyde Rash    Lanolin Rash    Other [No Clinical Screening - See Comments] Rash     Carba mix       ROS:  12 point review of systems negative other than symptoms noted below or in the HPI.  No urinary frequency or dysuria, bladder or kidney problems      OBJECTIVE:     BP (!) 140/90   Wt 64.4 kg (142 lb)   LMP 11/19/2024 (Exact Date)   BMI 22.99 kg/m    Body mass index is 22.99 kg/m .    Exam:  Constitutional:  Appearance: Well nourished, well developed alert, in no acute distress  Chest:  Respiratory Effort:  Breathing unlabored. Clear to auscultation bilaterally.   Cardiovascular: Heart: Auscultation:  Regular rate, normal rhythm, no murmurs present  Neurologic:  Mental Status:  Oriented X3.  Normal strength and tone, sensory exam grossly normal, mentation intact and speech normal.    Psychiatric:  Mentation appears normal and affect normal/bright.  Pelvic Exam:  External Genitalia:     Normal appearance for age, no discharge present, no tenderness present, no inflammatory lesions present, color normal  Vagina:     Normal vaginal vault without central or paravaginal defects, no discharge present, no inflammatory lesions present, no masses present  Pelvic Floor:   No pelvic floor tension myalgia  Bladder:     Nontender to palpation  Urethra:   Urethral Body:  Urethra palpation normal, urethra structural support normal   Urethral Meatus:  No erythema or lesions present  Cervix:     Appearance healthy, no lesions present, No bleeding, shallow post LEEP changes. Able to easily see external os with minor scar tissue.  Uterus:     Uterus: firm, normal sized and nontender, anteverted in position.   Adnexa:     No adnexal tenderness present, no adnexal masses present  Perineum:     Perineum within normal limits, no evidence of trauma, no rashes or skin lesions present    Danielle Rushing MA present as chaperone during sensitive exam.     In-Clinic Test Results:  No results found for this or any previous  visit (from the past 24 hours).    ASSESSMENT/PLAN:                                                        ICD-10-CM    1. Dysmenorrhea  N94.6 Ob/Gyn  Referral      2. Cervical stenosis (uterine cervix)  N88.2 Ob/Gyn  Referral        Shayla Nolasco is a 38 year old who presents to discuss significant dysmenorrhea (chronic, not well controlled) and has history of high grade dysplasia s/p LEEP.     First we discussed the patient's history of dysmenorrhea and possible etiologies and treatment options. Use of nonhormonal medications like NSAIDs were discussed (already using with minimal relief). Hormonal options including birth control pills with (was advised to avoid estrogen with fhx),  Depo Provera, Nexplanon, hormonal IUDs (Mirena) were also discussed along with related risks and benefits and side effects. Specifically, we extensively discussed LNG-IUD (Mirena). From an IUD standpoint, we discussed risks of infection, bleeding, and damage to other structures nearby with placement. Discussed low risk of uterine perforation, migration, passage, or difficult removal requiring additional surgical intervention. Reviewed most commonly able to remove in a clinic setting if not well tolerated. With history of LEEP and failed clinic placement, discussed recommendation for IUD attempt under US guidance as cervical dilation likely needed. We discussed options to do this in clinic (with premedication and paracervical block), under procedure sedation (with CRNA), or full anesthesia in the operating room.     Additionally, we discussed option of hysterectomy. We reviewed the plan procedure would be total laparoscopic hysterectomy (removal of uterus and cervix) and bilateral salpingectomy (removal of both fallopian tubes). We will plan for ovarian conservation. If there are unexpected findings intra-op (grossly abnormal ovary), she is in agreement to remove the affected organ at the time of surgery. I would  recommend removal of the cervix with her history of high grade dysplasia. As patient has had negative testing for any known hereditary genetic conditions increasing her risk of breast or ovarian cancer, and is 38  years old, would recommend leaving ovaries in place. We discussed even with bilateral oophorectomy, ovarian cancer risk is not completely eliminated. When ovaries are retained, re-operation for ovarian malignancy is rare, and operation for any indication <10%. Removal of ovaries also places patient in immediate surgical menopause and have other health impacts such as on bone or cardiac health.      I discussed the surgical risks of infection, bleeding, and injury. If infection is identified, additional treatment or procedures may be needed to resolve infection. Discussed risk of bleeding.  We discussed the risk of injury in detail including risks of injury to major and minor blood vessels, intestines/GI tract, nerves,  tract (ureterus, bladder). We discussed if injury is identified at the time of surgery, I would repair it, or call in help to repair the affected organ. We discussed that injury may not be identified at the time of surgery and may require additional evaluation and possibly additional surgery in the days, weeks, or months after surgery. Discussed this is a same day surgery and typical post-operative pain management. This would be a longer recovery with 6 weeks off of work post-op. No heavy lifting >15lbs with pelvic rest for 6 weeks and until cleared at follow up visit to assess vaginal cuff.      After discussion, patient would like to proceed with ultrasound guided Mirena IUD placement with sedation. Will schedule with CRNA. She understands we are booking a few months out. Will send cytotec to take prior and can premediate with ibuprofen and tylenol. Knows she needs a  day of. All questions answered.     Kriss Mendoza MD  [facility] [place]    Please  schedule CRNA for:    Patient Name:  Shayla Nolasco ([id number]).  :  1985    Expected time off from work:  day of procedure only  Surgeon:  Kriss Mendoza MD  Procedure permit to read:  Exam under anesthesia, possible cervical dilation, Mirena IUD placement under sedation with ultrasound guidance.    Anesthesia:  CRNA  EQUIPMENT NEEDS: Ultrasound  DIAGNOSIS: Dysmenorrhea    BMI MUST BE <35. WHAT IS THE PT BMI? 22  PATIENT WEIGHT 64 kg    POST OP TO BE DONE IN 4 WEEKS for 30 MINUTES  > string check    ANY ADDIDTIONAL MEDS OR INFORMATION NEEDED Please inform patient cytotec prescription sent to pharmacy

## 2025-01-20 ENCOUNTER — LAB (OUTPATIENT)
Dept: LAB | Facility: CLINIC | Age: 40
End: 2025-01-20
Payer: COMMERCIAL

## 2025-01-20 DIAGNOSIS — E06.3 HASHIMOTO'S DISEASE: ICD-10-CM

## 2025-01-20 LAB — TSH SERPL DL<=0.005 MIU/L-ACNC: 2.69 UIU/ML (ref 0.3–4.2)

## 2025-01-20 PROCEDURE — 36415 COLL VENOUS BLD VENIPUNCTURE: CPT

## 2025-01-20 PROCEDURE — 84443 ASSAY THYROID STIM HORMONE: CPT

## 2025-01-20 RX ORDER — LEVOTHYROXINE SODIUM 75 UG/1
75 TABLET ORAL DAILY
Qty: 90 TABLET | Refills: 3 | Status: SHIPPED | OUTPATIENT
Start: 2025-01-20

## 2025-01-21 NOTE — RESULT ENCOUNTER NOTE
Dear Shayla,   Your thyroid-stimulating hormone is normal.  Continue with your current dose of levothyroxine.  I refilled your levothyroxine prescription for the next 12 months.    Best Regards   Owen Lane MD

## 2025-02-11 ENCOUNTER — E-VISIT (OUTPATIENT)
Dept: URGENT CARE | Facility: CLINIC | Age: 40
End: 2025-02-11
Payer: COMMERCIAL

## 2025-02-11 DIAGNOSIS — N30.90 CYSTITIS: Primary | ICD-10-CM

## 2025-02-11 RX ORDER — NITROFURANTOIN 25; 75 MG/1; MG/1
100 CAPSULE ORAL 2 TIMES DAILY
Qty: 10 CAPSULE | Refills: 0 | Status: SHIPPED | OUTPATIENT
Start: 2025-02-11 | End: 2025-02-16

## 2025-02-11 NOTE — PATIENT INSTRUCTIONS
Dear Shayla Nolasco    After reviewing your responses, I've been able to diagnose you with a urinary tract infection, which is a common infection of the bladder with bacteria.  This is not a sexually transmitted infection, though urinating immediately after intercourse can help prevent infections.  Drinking lots of fluids is also helpful to clear your current infection and prevent the next one.      I have sent a prescription for antibiotics to your pharmacy to treat this infection.    It is important that you take all of your prescribed medication even if your symptoms are improving after a few doses.  Taking all of your medicine helps prevent the symptoms from returning.     If your symptoms worsen, you develop pain in your back or stomach, develop fevers, or are not improving in 5 days, please contact your primary care provider for an appointment or visit any of our convenient Walk-in or Urgent Care Centers to be seen, which can be found on our website here.    Thanks again for choosing us as your health care partner,    Pamela Mckee MD  Urinary Tract Infection (UTI) in Women: Care Instructions  Overview     A urinary tract infection (UTI) is an infection caused by bacteria. It can happen anywhere in the urinary tract. A UTI can happen in the:  Kidneys.  Ureters, the tubes that connect the kidneys to the bladder.  Bladder.  Urethra, where the urine comes out.  Most UTIs are bladder infections. They often cause pain or burning when you urinate.  Most UTIs can be cured with antibiotics. If you are prescribed antibiotics, be sure to complete your treatment so that the infection does not get worse.  Follow-up care is a key part of your treatment and safety. Be sure to make and go to all appointments, and call your doctor if you are having problems. It's also a good idea to know your test results and keep a list of the medicines you take.  How can you care for yourself at home?  Take your antibiotics as  "directed. Do not stop taking them just because you feel better. You need to take the full course of antibiotics.  Drink extra water and other fluids for the next day or two. This will help make the urine less concentrated and help wash out the bacteria that are causing the infection. (If you have kidney, heart, or liver disease and have to limit fluids, talk with your doctor before you increase the amount of fluids you drink.)  Avoid drinks that are carbonated or have caffeine. They can irritate the bladder.  Urinate often. Try to empty your bladder each time.  To relieve pain, take a hot bath or lay a heating pad set on low over your lower belly or genital area. Never go to sleep with a heating pad in place.  To prevent UTIs  Drink plenty of water each day. This helps you urinate often, which clears bacteria from your system. (If you have kidney, heart, or liver disease and have to limit fluids, talk with your doctor before you increase the amount of fluids you drink.)  Urinate when you need to.  If you are sexually active, urinate right after you have sex.  Change sanitary pads often.  Avoid douches, bubble baths, feminine hygiene sprays, and other feminine hygiene products that have deodorants.  After going to the bathroom, wipe from front to back.  When should you call for help?   Call your doctor now or seek immediate medical care if:    You have new or worse fever, chills, nausea, or vomiting.     You have new pain in your back just below your rib cage. This is called flank pain.     There is new blood or pus in your urine.     You have any problems with your antibiotic medicine.   Watch closely for changes in your health, and be sure to contact your doctor if:    You are not getting better after taking an antibiotic for 2 days.     Your symptoms go away but then come back.   Where can you learn more?  Go to https://www.healthwise.net/patiented  Enter K848 in the search box to learn more about \"Urinary Tract " "Infection (UTI) in Women: Care Instructions.\"  Current as of: April 30, 2024  Content Version: 14.3    2024 [employer].   Care instructions adapted under license by your healthcare professional. If you have questions about a medical condition or this instruction, always ask your healthcare professional. [employer] disclaims any warranty or liability for your use of this information.    "

## 2025-03-30 ENCOUNTER — HEALTH MAINTENANCE LETTER (OUTPATIENT)
Age: 40
End: 2025-03-30

## 2025-04-14 ENCOUNTER — TELEPHONE (OUTPATIENT)
Dept: OBGYN | Facility: CLINIC | Age: 40
End: 2025-04-14

## 2025-04-14 NOTE — TELEPHONE ENCOUNTER
Spoke to patient about the misoprostol 600 mcg vaginally to be taken the morning of the IUD placement. Her apt is scheduled at 0730 4/17 w CRNA - RN said she can take at bedtime with some ibuprofen as well.    Patient also reporting she should be getting her period any day now too - typically irregular and only last 4 days, not usually really heavy. If she does have her period on Thursday - procedure day - does she need the misoprostol?    Directing above question to Dr Mendoza.  She will call Wednesday to let us know if she does get her period as well.    Monika Sykes RN on 4/14/2025 at 10:43 AM

## 2025-04-17 ENCOUNTER — OFFICE VISIT (OUTPATIENT)
Dept: OBGYN | Facility: CLINIC | Age: 40
End: 2025-04-17
Payer: COMMERCIAL

## 2025-04-17 VITALS
TEMPERATURE: 97.4 F | SYSTOLIC BLOOD PRESSURE: 136 MMHG | RESPIRATION RATE: 15 BRPM | DIASTOLIC BLOOD PRESSURE: 82 MMHG | HEART RATE: 55 BPM

## 2025-04-17 DIAGNOSIS — Z30.430 ENCOUNTER FOR INSERTION OF INTRAUTERINE CONTRACEPTIVE DEVICE: ICD-10-CM

## 2025-04-17 DIAGNOSIS — Z01.812 PRE-PROCEDURE LAB EXAM: ICD-10-CM

## 2025-04-17 DIAGNOSIS — Z97.5 FAMILY PLANNING, IUD (INTRAUTERINE DEVICE) IN PLACE: Primary | ICD-10-CM

## 2025-04-17 LAB
HCG UR QL: NEGATIVE
INTERNAL QC OK POCT: NORMAL
POCT KIT EXPIRATION DATE: NORMAL
POCT KIT LOT NUMBER: NORMAL

## 2025-04-17 NOTE — NURSING NOTE
Patient arrived. NPO since :1830 4/16/25  : yes - name Ishaan    C/O: no complaints    Here for: Exam under anesthesia, possible cervical dilation. Mirena IUD placement under sedation with ultrasound guidance  Diagnosis: Contraceptive family planning  Allergies: sulfa, formaldehyde, lanolin, carba mix  Consent signed by patient, witness and provider.  See flowsheet for VS assessment throughout stay.    Urine HCG PCOT: negative    CRNA assumed care and IV started.  Procedure complete with Dr. Mendoza without complication.    Last sedation dose: 0757    ANALGESIA SIDE EFFECTS MONITORING:    [x] Awake and alert  [] Asleep and easy to arouse  [] Slightly drowsy, easily to arouse  [] Frequently drowsy, able to arouse, but drifts off to sleep  [] Somnolent, minimal response to stimulation    Patient tolerated procedure well.  Denies pain,heavy bleeding or dizziness.  IV removed with cath tip intact.    Discharge instructions reviewed prior to sedation and reviewed at time of discharge.  Pt verbalized understanding, in agreement with plan, and voiced no further questions.    Out the door ambulatory with  at 0839    Tayla Del Valle RN on 4/17/2025 at 8:56 AM  [facility] Triage

## 2025-04-17 NOTE — PROGRESS NOTES
INDICATIONS:                                                      Is a pregnancy test required: Yes.  Was it positive or negative?  Negative  Was a consent obtained?  Yes    Shayla Nolasco is a 39 year old female,   who presents for insertion of an IUD. The risks, benefits and alternatives of IUD insertion were discussed in detail previously. She also has reviewed the product brochure.  She has elected to go ahead with the insertion today and her questions were answered. Consent was signed.  A pregnancy test was performed today:  Yes and was negative.    Due to prior LEEP and failed IUD attempt with scar tissue, plan was made to place IUD under sedation with US guidance.     Lot: [id number]   Exp: 2027    PROCEDURE:                                                    The patient was positioned in lithotomy and IV sedation was performed by supervising CRNA. The pelvic exam revealed normal external genitalia.A speculum was inserted into the vagina and the cervix was visualized. The cervix was prepped with Betadine . The anterior lip of the cervix was grasped with a single toothed tenaculum. Cervix consistent with post-LEEP changes and some scar tissue. The smallest dilator did not pass. The uterus was confirmed to be anteverted on US. Ultimately, the tenaculum was moved to the posterior lip of the cervix to create a more favorable angle. Under direct US guidance, the cervix was serially dilated up to size 5 Hegar dilator. The uterus sounded to 7.5 cm. A Mirena IUD was then inserted without difficulty and confirmed to be fundal by US. The string was cut to 2 cm.  The patient tolerated the procedure well with IV sedation.      POST PROCEDURE:                                                      She  tolerated the procedure well with IV sedation. There were no complications. Patient was discharged in stable condition.    If not able to feel the IUD strings herself, return to clinic in 5 weeks for IUD check.   Call if severe cramping, fever, abnormal bleeding, abnormal discharge or pelvic pain develop.  Patient was counseled about the chance of irregular bleeding.    Kriss Mendoza MD

## 2025-06-30 ENCOUNTER — OFFICE VISIT (OUTPATIENT)
Dept: OBGYN | Facility: CLINIC | Age: 40
End: 2025-06-30
Payer: COMMERCIAL

## 2025-06-30 VITALS
BODY MASS INDEX: 23.46 KG/M2 | DIASTOLIC BLOOD PRESSURE: 70 MMHG | WEIGHT: 146 LBS | SYSTOLIC BLOOD PRESSURE: 118 MMHG | HEIGHT: 66 IN

## 2025-06-30 DIAGNOSIS — T83.32XA INTRAUTERINE CONTRACEPTIVE DEVICE THREADS LOST, INITIAL ENCOUNTER: Primary | ICD-10-CM

## 2025-06-30 PROCEDURE — 3078F DIAST BP <80 MM HG: CPT | Performed by: OBSTETRICS & GYNECOLOGY

## 2025-06-30 PROCEDURE — 99213 OFFICE O/P EST LOW 20 MIN: CPT | Performed by: OBSTETRICS & GYNECOLOGY

## 2025-06-30 PROCEDURE — 3074F SYST BP LT 130 MM HG: CPT | Performed by: OBSTETRICS & GYNECOLOGY

## 2025-06-30 PROCEDURE — 99459 PELVIC EXAMINATION: CPT | Performed by: OBSTETRICS & GYNECOLOGY

## 2025-06-30 NOTE — PROGRESS NOTES
SUBJECTIVE:                                                   Shayla Nolasco is a 39 year old female who presents to clinic today for the following health issue(s):  Patient presents with:  Post-op Visit: Mirena placed 25    HPI:  Here for postop visit. Had Mirena IUD placed under anesthesia with US guidance due to hx of LEEP. This was placed for dysmenorrhea management.   Had cramping and bleeding the first few weeks but now doing well. Had some cramping consistent with her cycle but no bleeding.       No LMP recorded. (Menstrual status: IUD).    Patient is sexually active, .  Using IUD for contraception.    reports that she has never smoked. She has never used smokeless tobacco.    STD testing offered?  Not discussed today    Health maintenance updated:  yes    Today's PHQ-2 Score:       1/3/2024     3:27 PM   PHQ-2 (  [employer])   Q1: Little interest or pleasure in doing things 0   Q2: Feeling down, depressed or hopeless 0   PHQ-2 Score 0   Q1: Little interest or pleasure in doing things Not at all   Q2: Feeling down, depressed or hopeless Not at all   PHQ-2 Score 0     Today's PHQ-9 Score:       2024     9:20 AM   PHQ-9 SCORE   PHQ-9 Total Score MyChart 3 (Minimal depression)   PHQ-9 Total Score 3        Patient-reported     Today's CATE-7 Score:       2024     9:21 AM   CATE-7 SCORE   Total Score 3 (minimal anxiety)   Total Score 3        Patient-reported       Problem list and histories reviewed & adjusted, as indicated.  Additional history: as documented.    Patient Active Problem List   Diagnosis    QUEENIE III (cervical intraepithelial neoplasia III)    Family history of ovarian cancer    Family history of BRCA gene positive    Mixed anxiety depressive disorder    Hashimoto's disease    Gastroesophageal reflux disease without esophagitis    History of loop electrical excision procedure (LEEP)     Past Surgical History:   Procedure Laterality Date    CL AFF SURGICAL PATHOLOGY   10/01/2007    LEEP    CL AFF SURGICAL PATHOLOGY  09/01/2007    ESOPHAGOSCOPY, GASTROSCOPY, DUODENOSCOPY (EGD), COMBINED N/A 10/19/2022    Procedure: ESOPHAGOGASTRODUODENOSCOPY, WITH BIOPSY;  Surgeon: David Bridges MD;  Location:  GI    GYN SURGERY  2007    LEEP Procedure      Social History     Tobacco Use    Smoking status: Never    Smokeless tobacco: Never   Substance Use Topics    Alcohol use: Yes     Alcohol/week: 3.0 standard drinks of alcohol     Types: 3 Standard drinks or equivalent per week     Comment: 1-2 drinks 1-3 times per week      Problem (# of Occurrences) Relation (Name,Age of Onset)    Asthma (2) Maternal Grandmother (Margo Goldstein), Paternal Grandmother (Timo Goldstein)    Cancer (1) Mother (Daphnie Nolasco, 51): Ovarian Cancer (3 times)    Circulatory (1) Maternal Grandfather (Timo Goldstein): blood clots    Diabetes (3) Mother (Daphnie Nolasco): Type I, Maternal Grandmother (Margo Goldstein): Type II, Father (Yobany Nolasco): Type II    Genetic Disorder (1) Mother (Daphnie Nolasco): BRCA2    Gastrointestinal Disease (1) Father (Yobany Nolasco): stomach ulcers    Hypertension (2) Maternal Grandmother (Margo Goldstein), Maternal Grandfather (Timo Goldstein)    Lipids (1) Maternal Grandfather (Timo Goldstein): high cholesterol    Neurologic Disorder (1) Father (Yobany Nolasco): migraines    Thyroid Disease (1) Mother (Daphnie Nolasco)    Cancer - colorectal (1) Paternal Grandfather (Kushal Nolasco): age 70's (had stomach Ca as a younger man)    Prostate Cancer (1) Paternal Grandfather (Kushal Nolasco)    C.A.D. (1) Maternal Grandfather (Timo Goldstein): MI    Other Cancer (2) Mother (Daphnie Nolasco): ovarian, Paternal Grandfather (Kushal Nolasco): stomach, non-hodgkins lymphoma, esophageal    Hyperlipidemia (1) Maternal Grandfather (Timo Goldstein)    Coronary Artery Disease (1) Maternal Grandfather (Timo Goldstein)           Negative family history of: Skin Cancer              Current Outpatient Medications   Medication Sig Dispense Refill     "Cholecalciferol (VITAMIN D3) 125 MCG (5000 UT) CHEW       fish oil-omega-3 fatty acids 1000 MG capsule Take 2 g by mouth daily      hydrOXYzine HCl (ATARAX) 25 MG tablet TAKE 1 TABLET BY MOUTH 3 TIMES DAILY AS NEEDED FOR ANXIETY. 270 tablet 1    levonorgestrel (MIRENA) 52 MG (20 mcg/day) IUD 1 each by Intrauterine route once.      levothyroxine (SYNTHROID/LEVOTHROID) 75 MCG tablet Take 1 tablet (75 mcg) by mouth daily. 90 tablet 3    Multiple Vitamins-Minerals (MULTIVITAMIN OR)        Current Facility-Administered Medications   Medication Dose Route Frequency Provider Last Rate Last Admin    levonorgestrel (MIRENA) 52 MG (20 mcg/day) IUD 1 each  1 each Intrauterine See Admin Instructions    1 each at 04/17/25 0813     Allergies   Allergen Reactions    Benzalkonium Chloride Other (See Comments)     Doubtful Positive (+) Skin Patch Test    Cocamidopropyl Betaine Other (See Comments)     Doubtful Positive (+) Skin Patch Test    Limonene Other (See Comments)     Doubtful Positive (+) Skin Patch Test    Methyldibromoglutaronitrile Other (See Comments)     Doubtful Positive (+) Skin Patch Test    Methylisothiazolinone Other (See Comments)     Doubtful Positive (+) Skin Patch Test    Neomycin Other (See Comments)     Doubtful Positive (+) Skin Patch Test    Sulfa Antibiotics Hives    Vitamin E Other (See Comments)     Doubtful Positive (+) Skin Patch Test    Formaldehyde Rash    Lanolin Rash    Other [No Clinical Screening - See Comments] Rash     Carba mix       ROS:  12 point review of systems negative other than symptoms noted below or in the HPI.  No urinary frequency or dysuria, bladder or kidney problems      OBJECTIVE:     /70   Ht 1.67 m (5' 5.75\")   Wt 66.2 kg (146 lb)   BMI 23.74 kg/m    Body mass index is 23.74 kg/m .    Exam:  Constitutional:  Appearance: Well nourished, well developed alert, in no acute distress  Pelvic Exam:  External Genitalia:     Normal appearance for age, no discharge present, no " tenderness present, no inflammatory lesions present, color normal  Vagina:    Normal vaginal vault without central or paravaginal defects, no discharge present, no inflammatory lesions present, no masses present  Bladder:     Nontender to palpation  Urethra:   Urethral Body:  Urethra palpation normal, urethra structural support normal   Urethral Meatus:  No erythema or lesions present  Cervix:     Appearance healthy, no lesions present, nontender to palpation, no bleeding present IUD strings not visualized  Uterus:     Nontender to palpation, no masses present, position anteflexed, mobility: normal  Adnexa:     No adnexal tenderness present, no adnexal masses present  Perineum:     Perineum within normal limits, no evidence of trauma, no rashes or skin lesions present  Anus:     Anus within normal limits, no hemorrhoids present  Inguinal Lymph Nodes:     No lymphadenopathy present  Pubic Hair:     Normal pubic hair distribution for age  Genitalia and Groin:     No rashes present, no lesions present, no areas of discoloration, no masses present     In-Clinic Test Results:  No results found for this or any previous visit (from the past 24 hours).    ASSESSMENT/PLAN:                                                        ICD-10-CM    1. Intrauterine contraceptive device threads lost, initial encounter  T83.32XA US Transvaginal Pelvic Non-OB        IUD placed under anesthesia with US guidance on 4/17/2025. Strings not visualized today. Will proceed with pelvic US. If normal, nothing further needs to be done (Mirena approved for 8 years for contraception and 5 years for AUB) and continue with routine OB care.     Kriss Mendoza MD  [facility] [place]

## 2025-07-09 NOTE — PROGRESS NOTES
"Shayla Nolasco is a 39 year old female who is being evaluated via a patient initiated billable telephone visit.     What phone number would you like to be contacted at? [phone]   How would you like to obtain your AVS? MyChart      Originating Location (pt. Location): home      Distant Location (provider location):  On-site    Reason for telephone (audio-only): {reasonforaudioonly:929476}        SUBJECTIVE:                                                   Shayla Nolasco is a 39 year old female who presents for virtual visit today for the following health issue(s):  No chief complaint on file.      Additional information: ***    Virtual Visit Details    Type of service:  Telephone Visit   Phone call duration: *** minutes   Originating Location (pt. Location): {patient location:664436::\"Home\"}  {PROVIDER LOCATION On-site should be selected for visits conducted from your clinic location or adjoining [facility] hospital, academic office, or other nearby [facility] building. Off-site should be selected for all other provider locations, including home:[id number]}  Distant Location (provider location):  {virtual location provider:569723}  Telephone visit completed due to {audio only reason:528701}        HPI:  ***    No LMP recorded. (Menstrual status: IUD)..     Patient is sexually active, .  Using IUD for contraception.    reports that she has never smoked. She has never used smokeless tobacco.      Health maintenance updated:  yes    Today's PHQ-2 Score:       1/3/2024     3:27 PM   PHQ-2 (  [employer])   Q1: Little interest or pleasure in doing things 0   Q2: Feeling down, depressed or hopeless 0   PHQ-2 Score 0   Q1: Little interest or pleasure in doing things Not at all   Q2: Feeling down, depressed or hopeless Not at all   PHQ-2 Score 0     Today's PHQ-9 Score:       2024     9:20 AM   PHQ-9 SCORE   PHQ-9 Total Score MyChart 3 (Minimal depression)   PHQ-9 Total Score 3        Patient-reported     Today's CATE-7 " Score:       12/12/2024     9:21 AM   CATE-7 SCORE   Total Score 3 (minimal anxiety)   Total Score 3        Patient-reported       Problem list and histories reviewed & adjusted, as indicated.  Additional history: as documented.    Patient Active Problem List   Diagnosis    QUEENIE III (cervical intraepithelial neoplasia III)    Family history of ovarian cancer    Family history of BRCA gene positive    Mixed anxiety depressive disorder    Hashimoto's disease    Gastroesophageal reflux disease without esophagitis    History of loop electrical excision procedure (LEEP)     Past Surgical History:   Procedure Laterality Date    CL AFF SURGICAL PATHOLOGY  10/01/2007    LEEP    CL AFF SURGICAL PATHOLOGY  09/01/2007    ESOPHAGOSCOPY, GASTROSCOPY, DUODENOSCOPY (EGD), COMBINED N/A 10/19/2022    Procedure: ESOPHAGOGASTRODUODENOSCOPY, WITH BIOPSY;  Surgeon: David Bridges MD;  Location:  GI    GYN SURGERY  2007    LEEP Procedure      Social History     Tobacco Use    Smoking status: Never    Smokeless tobacco: Never   Substance Use Topics    Alcohol use: Yes     Alcohol/week: 3.0 standard drinks of alcohol     Types: 3 Standard drinks or equivalent per week     Comment: 1-2 drinks 1-3 times per week      Problem (# of Occurrences) Relation (Name,Age of Onset)    Asthma (2) Maternal Grandmother (Margo Goldstein), Paternal Grandmother (Timo Goldstein)    Cancer (1) Mother (Daphnie Nolasco, 51): Ovarian Cancer (3 times)    Circulatory (1) Maternal Grandfather (Timo Goldstein): blood clots    Diabetes (3) Mother (Daphnie Nolasco): Type I, Maternal Grandmother (Margo Goldstein): Type II, Father (Yobany Nolasco): Type II    Genetic Disorder (1) Mother (Daphnie Nolasco): BRCA2    Gastrointestinal Disease (1) Father (Yobany Nolasco): stomach ulcers    Hypertension (2) Maternal Grandmother (Margo Goldstein), Maternal Grandfather (Timo Goldstein)    Lipids (1) Maternal Grandfather (Timo Goldstein): high cholesterol    Neurologic Disorder (1) Father (Yobany Nolasco): migraines     Thyroid Disease (1) Mother (Daphnie Nolasco)    Cancer - colorectal (1) Paternal Grandfather (Kushal Nolasco): age 70's (had stomach Ca as a younger man)    Prostate Cancer (1) Paternal Grandfather (Kushal Nolasco)    C.A.D. (1) Maternal Grandfather (Timo Goldstein): MI    Other Cancer (2) Mother (Daphnie Nolasco): ovarian, Paternal Grandfather (Kushal Nolasco): stomach, non-hodgkins lymphoma, esophageal    Hyperlipidemia (1) Maternal Grandfather (Timo Goldstein)    Coronary Artery Disease (1) Maternal Grandfather (Timo Goldstein)           Negative family history of: Skin Cancer              Current Outpatient Medications   Medication Sig Dispense Refill    Cholecalciferol (VITAMIN D3) 125 MCG (5000 UT) CHEW       fish oil-omega-3 fatty acids 1000 MG capsule Take 2 g by mouth daily      hydrOXYzine HCl (ATARAX) 25 MG tablet TAKE 1 TABLET BY MOUTH 3 TIMES DAILY AS NEEDED FOR ANXIETY. 270 tablet 1    levonorgestrel (MIRENA) 52 MG (20 mcg/day) IUD 1 each by Intrauterine route once.      levothyroxine (SYNTHROID/LEVOTHROID) 75 MCG tablet Take 1 tablet (75 mcg) by mouth daily. 90 tablet 3    Multiple Vitamins-Minerals (MULTIVITAMIN OR)        Current Facility-Administered Medications   Medication Dose Route Frequency Provider Last Rate Last Admin    levonorgestrel (MIRENA) 52 MG (20 mcg/day) IUD 1 each  1 each Intrauterine See Admin Instructions    1 each at 04/17/25 0813     Allergies   Allergen Reactions    Benzalkonium Chloride Other (See Comments)     Doubtful Positive (+) Skin Patch Test    Cocamidopropyl Betaine Other (See Comments)     Doubtful Positive (+) Skin Patch Test    Limonene Other (See Comments)     Doubtful Positive (+) Skin Patch Test    Methyldibromoglutaronitrile Other (See Comments)     Doubtful Positive (+) Skin Patch Test    Methylisothiazolinone Other (See Comments)     Doubtful Positive (+) Skin Patch Test    Neomycin Other (See Comments)     Doubtful Positive (+) Skin Patch Test    Sulfa Antibiotics Hives     Vitamin E Other (See Comments)     Doubtful Positive (+) Skin Patch Test    Formaldehyde Rash    Lanolin Rash    Other [No Clinical Screening - See Comments] Rash     Carba mix         OBJECTIVE:     No vitals were obtained today due to virtual telephone visit.    Physical Exam  {video visit exam brief selected:[id number]}          ASSESSMENT/PLAN:                                                      Phone call duration: *** minutes    No diagnosis found.    There are no Patient Instructions on file for this visit.    ***    Kriss Mendoza MD  [facility] [place]

## 2025-07-10 ENCOUNTER — VIRTUAL VISIT (OUTPATIENT)
Dept: OBGYN | Facility: CLINIC | Age: 40
End: 2025-07-10
Payer: COMMERCIAL

## 2025-07-10 ENCOUNTER — ANCILLARY PROCEDURE (OUTPATIENT)
Dept: ULTRASOUND IMAGING | Facility: CLINIC | Age: 40
End: 2025-07-10
Payer: COMMERCIAL

## 2025-07-10 DIAGNOSIS — T83.32XA INTRAUTERINE CONTRACEPTIVE DEVICE THREADS LOST, INITIAL ENCOUNTER: Primary | ICD-10-CM

## 2025-07-10 DIAGNOSIS — T83.32XA INTRAUTERINE CONTRACEPTIVE DEVICE THREADS LOST, INITIAL ENCOUNTER: ICD-10-CM

## 2025-07-10 PROCEDURE — 99207 PR NO BILLABLE SERVICE THIS VISIT: CPT | Performed by: OBSTETRICS & GYNECOLOGY

## 2025-07-10 PROCEDURE — 76830 TRANSVAGINAL US NON-OB: CPT | Performed by: OBSTETRICS & GYNECOLOGY

## 2025-07-10 PROCEDURE — 76377 3D RENDER W/INTRP POSTPROCES: CPT | Performed by: OBSTETRICS & GYNECOLOGY

## 2025-07-11 ENCOUNTER — HOSPITAL ENCOUNTER (OUTPATIENT)
Dept: MAMMOGRAPHY | Facility: CLINIC | Age: 40
Discharge: HOME | End: 2025-07-11
Attending: FAMILY MEDICINE | Admitting: FAMILY MEDICINE
Payer: COMMERCIAL

## 2025-07-11 DIAGNOSIS — Z12.31 VISIT FOR SCREENING MAMMOGRAM: ICD-10-CM

## 2025-07-11 PROCEDURE — 77067 SCR MAMMO BI INCL CAD: CPT

## 2025-08-15 ENCOUNTER — TRANSFERRED RECORDS (OUTPATIENT)
Dept: HEALTH INFORMATION MANAGEMENT | Facility: CLINIC | Age: 40
End: 2025-08-15
Payer: COMMERCIAL

## (undated) RX ORDER — FENTANYL CITRATE 50 UG/ML
INJECTION, SOLUTION INTRAMUSCULAR; INTRAVENOUS
Status: DISPENSED
Start: 2022-10-19